# Patient Record
Sex: MALE | Race: WHITE | NOT HISPANIC OR LATINO | Employment: FULL TIME | URBAN - METROPOLITAN AREA
[De-identification: names, ages, dates, MRNs, and addresses within clinical notes are randomized per-mention and may not be internally consistent; named-entity substitution may affect disease eponyms.]

---

## 2017-02-15 ENCOUNTER — GENERIC CONVERSION - ENCOUNTER (OUTPATIENT)
Dept: OTHER | Facility: OTHER | Age: 42
End: 2017-02-15

## 2017-02-15 ENCOUNTER — ALLSCRIPTS OFFICE VISIT (OUTPATIENT)
Dept: OTHER | Facility: OTHER | Age: 42
End: 2017-02-15

## 2017-02-15 LAB
BILIRUB UR QL STRIP: NEGATIVE
CLARITY UR: NORMAL
COLOR UR: YELLOW
GLUCOSE (HISTORICAL): NEGATIVE
HGB UR QL STRIP.AUTO: NEGATIVE
KETONES UR STRIP-MCNC: NEGATIVE MG/DL
LEUKOCYTE ESTERASE UR QL STRIP: NEGATIVE
NITRITE UR QL STRIP: NEGATIVE
PH UR STRIP.AUTO: 7.5 [PH]
PROT UR STRIP-MCNC: NEGATIVE MG/DL
SP GR UR STRIP.AUTO: 1
UROBILINOGEN UR QL STRIP.AUTO: 0.2

## 2017-02-27 ENCOUNTER — GENERIC CONVERSION - ENCOUNTER (OUTPATIENT)
Dept: OTHER | Facility: OTHER | Age: 42
End: 2017-02-27

## 2017-02-27 LAB
25(OH)D3 SERPL-MCNC: 13.6 NG/ML (ref 30–100)
AMBIG ABBREV CMP14 DEFAULT (HISTORICAL): NORMAL
BASOPHILS # BLD AUTO: 0 X10E3/UL (ref 0–0.2)
BASOPHILS # BLD AUTO: 1 %
DEPRECATED RDW RBC AUTO: 12.7 % (ref 12.3–15.4)
EOSINOPHIL # BLD AUTO: 0.2 X10E3/UL (ref 0–0.4)
EOSINOPHIL # BLD AUTO: 5 %
HCT VFR BLD AUTO: 44.3 % (ref 37.5–51)
HGB BLD-MCNC: 15.6 G/DL (ref 12.6–17.7)
IMM.GRANULOCYTES (CD4/8) (HISTORICAL): 0 %
IMM.GRANULOCYTES (CD4/8) (HISTORICAL): 0 X10E3/UL (ref 0–0.1)
LYMPHOCYTES # BLD AUTO: 1.5 X10E3/UL (ref 0.7–3.1)
LYMPHOCYTES # BLD AUTO: 38 %
MCH RBC QN AUTO: 31.9 PG (ref 26.6–33)
MCHC RBC AUTO-ENTMCNC: 35.2 G/DL (ref 31.5–35.7)
MCV RBC AUTO: 91 FL (ref 79–97)
MONOCYTES # BLD AUTO: 0.3 X10E3/UL (ref 0.1–0.9)
MONOCYTES (HISTORICAL): 7 %
NEUTROPHILS # BLD AUTO: 2 X10E3/UL (ref 1.4–7)
NEUTROPHILS # BLD AUTO: 49 %
PLATELET # BLD AUTO: 222 X10E3/UL (ref 150–379)
RBC (HISTORICAL): 4.89 X10E6/UL (ref 4.14–5.8)
WBC # BLD AUTO: 4 X10E3/UL (ref 3.4–10.8)

## 2017-02-28 ENCOUNTER — GENERIC CONVERSION - ENCOUNTER (OUTPATIENT)
Dept: OTHER | Facility: OTHER | Age: 42
End: 2017-02-28

## 2017-02-28 LAB
A/G RATIO (HISTORICAL): 2.1 (ref 1.1–2.5)
ALBUMIN SERPL BCP-MCNC: 4.7 G/DL (ref 3.5–5.5)
ALP SERPL-CCNC: 53 IU/L (ref 39–117)
ALT SERPL W P-5'-P-CCNC: 19 IU/L (ref 0–44)
AST SERPL W P-5'-P-CCNC: 19 IU/L (ref 0–40)
BILIRUB SERPL-MCNC: 0.4 MG/DL (ref 0–1.2)
BUN SERPL-MCNC: 15 MG/DL (ref 6–24)
BUN/CREA RATIO (HISTORICAL): 25 (ref 9–20)
CALCIUM SERPL-MCNC: 10.1 MG/DL (ref 8.7–10.2)
CHLORIDE SERPL-SCNC: 98 MMOL/L (ref 96–106)
CHOLEST SERPL-MCNC: 184 MG/DL (ref 100–199)
CO2 SERPL-SCNC: 27 MMOL/L (ref 18–29)
CREAT SERPL-MCNC: 0.6 MG/DL (ref 0.76–1.27)
EGFR AFRICAN AMERICAN (HISTORICAL): 144 ML/MIN/1.73
EGFR-AMERICAN CALC (HISTORICAL): 125 ML/MIN/1.73
GLUCOSE SERPL-MCNC: 106 MG/DL (ref 65–99)
HDLC SERPL-MCNC: 52 MG/DL
LDLC SERPL CALC-MCNC: 98 MG/DL (ref 0–99)
POTASSIUM SERPL-SCNC: 5 MMOL/L (ref 3.5–5.2)
SODIUM SERPL-SCNC: 141 MMOL/L (ref 134–144)
T4 FREE SERPL-MCNC: 1.18 NG/DL (ref 0.82–1.77)
TOT. GLOBULIN, SERUM (HISTORICAL): 2.2 G/DL (ref 1.5–4.5)
TOTAL PROTEIN (HISTORICAL): 6.9 G/DL (ref 6–8.5)
TRIGL SERPL-MCNC: 168 MG/DL (ref 0–149)
TSH SERPL DL<=0.05 MIU/L-ACNC: 2.1 UIU/ML (ref 0.45–4.5)

## 2017-03-01 ENCOUNTER — GENERIC CONVERSION - ENCOUNTER (OUTPATIENT)
Dept: OTHER | Facility: OTHER | Age: 42
End: 2017-03-01

## 2017-03-01 LAB
INTERPRETATION (HISTORICAL): NORMAL
QAUNTIFERON NIL VALUE (HISTORICAL): 0.05 IU/ML
QFT TB AG MINUS NIL VALUE (HISTORICAL): 0.04 IU/ML
QUANTIFERON CRITERIA (HISTORICAL): NORMAL
QUANTIFERON INCUB COMMENT (HISTORICAL): NORMAL
QUANTIFERON MITOGEN VALUE (HISTORICAL): 8.55 IU/ML
QUANTIFERON TB AG VALUE (HISTORICAL): 0.09 IU/ML
QUANTIFERON TB GOLD (HISTORICAL): NEGATIVE

## 2018-01-13 NOTE — RESULT NOTES
Message   can you please let pt know that his quantiferon gold tb test was negative? marissa you     Verified Results  ([facility]) QuantiFERON TB Gold (In Tube) [id number] 07:56AM Arabella Becerra     Test Name Result Flag Reference   QuantiFERON Incubation Comment     Specimen incubated at [address], [place]  QuantiFERON TB Gold Negative  Negative   QuantiFERON Criteria Comment     To be considered positive a specimen should have a TB Ag minus Nil  value greater than or equal to 0 35 IU/mL and in addition the TB Ag  minus Nil value must be greater than or equal to 25% of the Nil  value  There may be insufficient information in these values to  differentiate between some negative and some indeterminate test  values  QuantiFERON TB Ag Value 0 09 IU/mL     QuantiFERON Nil Value 0 05 IU/mL     QuantiFERON Mitogen Value 8 55 IU/mL     QFT TB Ag minus Nil Value 0 04 IU/mL     Interpretation: Comment     The QuantiFERON TB Gold (in Tube) assay is intended for use as an aid  in the diagnosis of TB infection  Negative results suggest that there  is no TB infection  In patients with high suspicion of exposure, a  negative test should be repeated  A positive test indicates infection  with Mycobacterium tuberculosis  Among individuals without  tuberculosis infection, a positive test may be due to exposure to  [place], M  jv or M  marinum  On the Internet, go to  cdc gov/tb for further details

## 2018-01-14 NOTE — RESULT NOTES
Message   Can you please let patient know that his blood work was overall within a normal range including kidneys, liver, thyroid  His blood sugar was minimally elevated noted to be 106  I would like to check A1c, measure of blood sugar over a 3 month  I will print this out  In addition, his total cholesterol is 184, his bad cholesterol was 98 and his good cholesterol 52  History triglycerides were minimally elevated at 168, recommend avoiding carbs as well as fried fatty foods  In addition, his vitamin D level was noted to be 13 6, in the deficient range  I would like patient to start supplementation  I will call in Rx to his pharmacy  I would like to recheck this in 4 months  I will print out blood work Rx for vitamin D  Thank you     Verified Results  (1) CBC/PLT/DIFF [id number] 07:56AM Arabella Becerra     Test Name Result Flag Reference   WBC 4 0 x10E3/uL  3 4-10 8   RBC 4 89 x10E6/uL  4 14-5 80   Hemoglobin 15 6 g/dL  12 6-17 7   Hematocrit 44 3 %  37 5-51 0   MCV 91 fL  79-97   MCH 31 9 pg  26 6-33 0   MCHC 35 2 g/dL  31 5-35 7   RDW 12 7 %  12 3-15 4   Platelets 643 T01H2/RA  150-379   Neutrophils 49 %     Lymphs 38 %     Monocytes 7 %     Eos 5 %     Basos 1 %     Neutrophils (Absolute) 2 0 x10E3/uL  1 4-7 0   Lymphs (Absolute) 1 5 x10E3/uL  0 7-3 1   Monocytes(Absolute) 0 3 x10E3/uL  0 1-0 9   Eos (Absolute) 0 2 x10E3/uL  0 0-0 4   Baso (Absolute) 0 0 x10E3/uL  0 0-0 2   Immature Granulocytes 0 %     Immature Grans (Abs) 0 0 x10E3/uL  0 0-0 1     [facility] hospital[facility] CMP14 Default [id number] 07:56AM Carolina Christianson     Test Name Result Flag Reference   Guzman Luna CMP14 Default Comment     A hand-written panel/profile was received from your office  In  accordance with the [facility] Ambiguous Test Code Policy dated July 8473, we have completed your order by using the closest currently  or formerly recognized AMA panel    We have assigned Comprehensive  Metabolic Panel (14), Test Code #907872 to this request   If this  is not the testing you wished to receive on this specimen, please  contact the [address] [place] [address] Client Inquiry/Technical Services Department  to clarify the test order  We appreciate your business  [facility]) TSH+Free T4 [id number] 07:56AM Arabella Becerra     Test Name Result Flag Reference   TSH 2 100 uIU/mL  0 450-4 500   T4,Free(Direct) 1 18 ng/dL  0 82-1 77     (1) COMPREHENSIVE METABOLIC PANEL [id number] 26:51UD Arabella Becerra     Test Name Result Flag Reference   Glucose, Serum 106 mg/dL H 65-99   BUN 15 mg/dL  6-24   Creatinine, Serum 0 60 mg/dL L 0 76-1 27   eGFR If NonAfricn Am 125 mL/min/1 73  >59   eGFR If Africn Am 144 mL/min/1 73  >59   BUN/Creatinine Ratio 25 H 9-20   Sodium, Serum 141 mmol/L  134-144   Potassium, Serum 5 0 mmol/L  3 5-5 2   Chloride, Serum 98 mmol/L     Carbon Dioxide, Total 27 mmol/L  18-29   Calcium, Serum 10 1 mg/dL  8 7-10 2   Protein, Total, Serum 6 9 g/dL  6 0-8 5   Albumin, Serum 4 7 g/dL  3 5-5 5   Globulin, Total 2 2 g/dL  1 5-4 5   A/G Ratio 2 1  1 1-2 5   **Effective March 13, 2017 the reference interval**                   for A/G Ratio will be changing to:                               Age                Male          Female                           0 -  7 days       1 1 - 2 3       1 1 - 2 3                           8 - 30 days       1 2 - 2 8       1 2 - 2 8                           1 -  6 months     1 3 - 3 6       1 3 - 3 6                    7 months -  5 years      1 5 - 2 6       1 5 - 2 6                              > 5 years      1 2 - 2 2       1 2 - 2 2   Bilirubin, Total 0 4 mg/dL  0 0-1 2   Alkaline Phosphatase, S 53 IU/L     AST (SGOT) 19 IU/L  0-40   ALT (SGPT) 19 IU/L  0-44     (1) LIPID PANEL FASTING W DIRECT LDL REFLEX [id number] 07:56AM Arabella Becerra     Test Name Result Flag Reference   Cholesterol, Total 184 mg/dL  100-199   Triglycerides 168 mg/dL H 0-149   HDL Cholesterol 52 mg/dL  >39   LDL Cholesterol Calc 98 mg/dL  0-99 (1) VITAMIN D 25-HYDROXY [id number] 07:56AM Arabella Becerra     Test Name Result Flag Reference   Vitamin D, 25-Hydroxy 13 6 ng/mL L 30 0-100 0   Vitamin D deficiency has been defined by the [address] practice guideline as a  level of serum 25-OH vitamin D less than 20 ng/mL (1,2)  The Endocrine Society went on to further define vitamin D  insufficiency as a level between 21 and 29 ng/mL (2)  1  IOM ([place] of Medicine)  2010  Dietary reference     intakes for calcium and D  [facility]: The     [place]  2  Xiomara PORTILLO, Desiree HUTCHINSON, Trevor MARTI, et al      Evaluation, treatment, and prevention of vitamin D     deficiency: an Endocrine Society clinical practice     guideline  JCEM  2011 Jul; 96(7):1911-30

## 2018-01-15 NOTE — RESULT NOTES
Verified Results  Urine Dip Non-Automated- POC [id number] 02:40PM Arabella Becerra     Test Name Result Flag Reference   Color Yellow     Clarity Transparent     Leukocytes negative     Nitrite negative     Blood negative     Bilirubin negative     Urobilinogen 0 2     Protein negative     Ph 7 5     Specific Gravity 1 000     Ketone negative     Glucose negative     Color Yellow     Clarity Transparent     Leukocytes negative     Nitrite negative     Blood negative     Bilirubin negative     Urobilinogen 0 2     Protein negative     Ph 7 5     Specific Gravity 1 000     Ketone negative     Glucose negative               Plan  Need for Tdap vaccination    · Adacel 5-2-15 5 LF-MCG/0 5 Intramuscular Suspension

## 2018-01-15 NOTE — PROGRESS NOTES
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)   · treated for 11 months    Plan   Fatigue    · (1) CBC/PLT/DIFF; Status:Active; Requested for:66Znt7312;    · (1) VITAMIN D 25-HYDROXY; Status:Active; Requested for:09Nzx0688;   Fatigue, Hypertension    · (1) COMPREHENSIVE METABOLIC PANEL; Status:Active; Requested for:39Rja1341;    · (1) TSH WITH FT4 REFLEX; Status:Active; Requested for:65Mzq3159; Health Maintenance    · Begin a limited exercise program ; Status:Complete;   Done: 63AED1557   · Begin or continue regular aerobic exercise  Gradually work up to at least 3 sessions of 30  minutes of exercise a week ; Status:Complete;   Done: 25YVH0452   · Brush your teeth 3 times a day and floss at least once a day ; Status:Complete;   Done:  27RPO6582   · Decreasing the stress in your life may help your condition improve ; Status:Complete;    Done: 72GWC5411   · Drink plenty of fluids ; Status:Complete;   Done: 77NTJ5380   · Regular aerobic exercise can help reduce stress ; Status:Complete;   Done: 93ULE7833   · Stretch and warm up your muscles during the first 10 minutes , then cool down your  muscles for the last 10 minutes of exercise ; Status:Complete;   Done: 39QAL8394   · Use a sun block product with an SPF of 15 or more ; Status:Complete;   Done:  62DTF4199   · We recommend routine visits to a dentist ; Status:Complete;   Done: 59YRV7844   · We recommend that you follow the "Mediterranean diet "; Status:Complete;   Done:  49YIZ6842   · Call [phone] if: You have any warning signs of skin cancer ; Status:Complete;    Done: 83QCV7533   · Call 911 if:  You experience a new kind of chest pain (angina) or pressure ;  Status:Complete;   Done: 27OPG2289  History of positive PPD    · (1) QUANTIFERON - TB GOLD; Status:Active; Requested for:82Uzi3454;   Screening for lipoid disorders    · (1) LIPID PANEL FASTING W DIRECT LDL REFLEX; Status:Active; Requested  for:24Gbi8188;     Urine Dip Non-Automated- POC; Status:Resulted - Requires Verification,Retrospective By Protocol Authorization;   Done: 71UPB6973 12:00AM  Due:22Gqq5028; Last Updated By:Uyen Crowe; 2/15/2017 4:27:06 PM;Ordered;    For:Health Maintenance; Ordered By:Arabella Becerra; Discussion/Summary  Impression: health maintenance visit  Currently, he eats a healthy diet  Prostate cancer screening: PSA is not indicated  Colorectal cancer screening: colorectal cancer screening is not indicated  Screening lab work includes glucose, lipid profile and 25-hydroxyvitamin D  He was advised to be evaluated by an ophthalmologist and a dentist  Advice and education were given regarding nutrition, aerobic exercise, vitamin D supplements and sunscreen use  51-year-old male here for routine health maintenance exam      Re  HTN: Initially elevated however upon recheck decreased  Patient's Benicar was recently switched to generic brand  He will let me know if it continues to be elevated  Will write Rx for layla  Will monitor  Re  h/o PPD positive history, will check QuantiFERON gold    Re  [facility] Will check routine blood work  Administered tdap today  Discussed routine health maintenance including maintaining adequate amounts of fruits and vegetables, exercising routinely, getting adequate amount of sleep  Care guide has been provided  Will call patient with results  Possible side effects of new medications were reviewed with the patient/guardian today  The treatment plan was reviewed with the patient/guardian   The patient/guardian understands and agrees with the treatment plan   The patient was counseled regarding diagnostic results, instructions for management, risk factor reductions, prognosis, patient and family education, impressions, risks and benefits of treatment options, importance of compliance with treatment  Chief Complaint  Patient is here for a yearly physical Patient need a screening for tuberculosis  All medications were reviewed and updated with the patient  History of Present Illness  HM, Adult Male: The patient is being seen for a health maintenance evaluation  General Health: The patient's health since the last visit is described as good  He has regular dental visits  Lifestyle:  He consumes a diverse and healthy diet  He does not use tobacco    Screening:   HPI: 80-year-old male here for routine health maintenance exam  Patient has a history of hypertension  He has been on Benicar however more recently over the past few days was switched to a generic brand of Benicar  Denies any complaints  Patient has history of PPD positive history of was treated with medications for 11 months in the past  He does require TB testing is agreeable to have quantiferon gold  Review of Systems    Constitutional: no fever and no chills  Eyes: No complaints of eye pain, no red eyes, no discharge from eyes, no itchy eyes  ENT: no complaints of earache, no hearing loss, no nosebleeds, no nasal discharge, no sore throat, no hoarseness  Cardiovascular: no chest pain, no palpitations and no extremity edema  Respiratory: no shortness of breath  Gastrointestinal: no abdominal pain and no constipation  Genitourinary: no dysuria  Psychiatric: no anxiety and no depression  Active Problems    1  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)   2  Hypertension (401 9) (I10)   3  Neck pain (723 1) (M54 2)   4   Obesity (278 00) (E66 9)    Past Medical History    · History of Gout of big toe (274 01) (M10 9)   · History of Herniated disc (722 2)    Surgical History    · History of Prior Surgical Procedure Not Done    Family History  Father    · Family history of gout (V18 19) (Z82 69)    Social History    · Exercises regularly   · Never smoker   · Social alcohol use (Z78 9)    Current Meds   1  Benicar 20 MG Oral Tablet; TAKE 1 TABLET DAILY; Therapy: 57XQM3482 to (Henrietta Mustache)  Requested for: 21XPF8571; Last   Rx:46Gff5663 Ordered   2  ZyrTEC Allergy 10 MG Oral Tablet; Therapy: 31TAP6243 to Recorded    Allergies    1  No Known Drug Allergies    Vitals   Recorded: 52AAR7946 03:08PM Recorded: 95BFD0048 02:29PM   Temperature  97 F   Heart Rate  68   Respiration  18   Systolic 941 754   Diastolic 88 94   Height  6 ft 0 5 in   Weight  221 lb 4 00 oz   BMI Calculated  29 59   BSA Calculated  2 24     Physical Exam    Constitutional   General appearance: No acute distress, well appearing and well nourished  Eyes   Conjunctiva and lids: No erythema, swelling or discharge  Pupils and irises: Equal, round, reactive to light  Ears, Nose, Mouth, and Throat   Otoscopic examination: Tympanic membranes translucent with normal light reflex  Canals patent without erythema  Oropharynx: Normal with no erythema, edema, exudate or lesions  Neck   Neck: Supple, symmetric, trachea midline, no masses  Thyroid: Normal, no thyromegaly  Pulmonary   Respiratory effort: No increased work of breathing or signs of respiratory distress  Auscultation of lungs: Clear to auscultation  Cardiovascular   Auscultation of heart: Normal rate and rhythm, normal S1 and S2, no murmurs  Carotid pulses: 2+ bilaterally  Examination of extremities for edema and/or varicosities: Normal     Abdomen   Abdomen: Non-tender, no masses  Liver and spleen: No hepatomegaly or splenomegaly  Lymphatic   Palpation of lymph nodes in neck: No lymphadenopathy  Neurologic   Cranial nerves: Cranial nerves 2-12 intact      Psychiatric   Mood and affect: Normal        Results/Data  PHQ-2 Adult Depression Screening 20FTL4235 02:41PM User, s     Test Name Result Flag Reference   PHQ-2 Adult Depression Score 0     Over the last two weeks, how often have you been bothered by any of the following problems?   Little interest or pleasure in doing things: Not at all - 0  Feeling down, depressed, or hopeless: Not at all - 0   PHQ-2 Adult Depression Screening Negative         Signatures   Electronically signed by : Carol Pratt MD; Feb 17 2017  7:40PM EST                       (Author)

## 2018-01-22 VITALS
BODY MASS INDEX: 29.32 KG/M2 | HEART RATE: 68 BPM | SYSTOLIC BLOOD PRESSURE: 120 MMHG | RESPIRATION RATE: 18 BRPM | TEMPERATURE: 97 F | WEIGHT: 221.25 LBS | DIASTOLIC BLOOD PRESSURE: 88 MMHG | HEIGHT: 73 IN

## 2018-02-03 DIAGNOSIS — I10 HYPERTENSION, UNSPECIFIED TYPE: Primary | ICD-10-CM

## 2018-02-03 RX ORDER — OLMESARTAN MEDOXOMIL 20 MG/1
TABLET, FILM COATED ORAL
Qty: 90 TABLET | Refills: 0 | Status: SHIPPED | OUTPATIENT
Start: 2018-02-03 | End: 2018-02-16 | Stop reason: CLARIF

## 2018-02-15 ENCOUNTER — TELEPHONE (OUTPATIENT)
Dept: FAMILY MEDICINE CLINIC | Facility: CLINIC | Age: 43
End: 2018-02-15

## 2018-02-15 NOTE — TELEPHONE ENCOUNTER
Pt's insurance denied Coverage for Brand Benicar  Pt must have tried and failed 3 of the formulary alternatives prior to covering Brand necessary med  Formulary alternatives are as follows:  1  Candesartan  2  Eprosartan  3  Irbesartan  4  Losartan  5  Olmesartasn  6  Telmisartan  7  Valsartan  Please change to formulary alternative  Please call pt with outcome

## 2018-02-16 DIAGNOSIS — I10 HYPERTENSION, UNSPECIFIED TYPE: Primary | ICD-10-CM

## 2018-02-16 RX ORDER — LOSARTAN POTASSIUM 50 MG/1
25 TABLET ORAL DAILY
Qty: 30 TABLET | Refills: 5 | Status: SHIPPED | OUTPATIENT
Start: 2018-02-16 | End: 2018-06-04 | Stop reason: SDUPTHER

## 2018-02-16 NOTE — TELEPHONE ENCOUNTER
I will call in an Rx for losartan 50 milligrams which is approximately equivalent to 20 milligrams of olmesartan which patient is taking  Patient is due for an office visit at the end of the month as well as blood work and blood pressure check  Because I am changing blood pressure medications, I would like patient to come in for Re follow-up appointment with me in 2-3 weeks    Thank you

## 2018-02-28 ENCOUNTER — TELEPHONE (OUTPATIENT)
Dept: FAMILY MEDICINE CLINIC | Facility: CLINIC | Age: 43
End: 2018-02-28

## 2018-02-28 NOTE — TELEPHONE ENCOUNTER
Patient needs a TB test done for his employment, he stated he can't do the skin test that last year you order bloodwork to check for this  Please write a RX for patient to have this done again  Also he wants to know if he needs any annual bloodwork done for his upcoming appointment w/ you in March so he can do everything at once  Please advise patient at [phone]

## 2018-03-01 ENCOUNTER — TELEPHONE (OUTPATIENT)
Dept: FAMILY MEDICINE CLINIC | Facility: CLINIC | Age: 43
End: 2018-03-01

## 2018-03-01 DIAGNOSIS — Z92.89 HISTORY OF POSITIVE PPD: Primary | ICD-10-CM

## 2018-03-01 DIAGNOSIS — I10 HYPERTENSION, UNSPECIFIED TYPE: ICD-10-CM

## 2018-03-01 DIAGNOSIS — Z13.220 ENCOUNTER FOR LIPID SCREENING FOR CARDIOVASCULAR DISEASE: ICD-10-CM

## 2018-03-01 DIAGNOSIS — Z13.6 ENCOUNTER FOR LIPID SCREENING FOR CARDIOVASCULAR DISEASE: ICD-10-CM

## 2018-03-01 DIAGNOSIS — E55.9 VITAMIN D DEFICIENCY: ICD-10-CM

## 2018-03-01 NOTE — TELEPHONE ENCOUNTER
Following up on previous message from yesterday  Please fax the script over if possible  Please call me before sending it

## 2018-03-17 LAB
25(OH)D3 SERPL-MCNC: 24 NG/ML (ref 30–100)
ALBUMIN SERPL-MCNC: 4.8 G/DL (ref 3.6–5.1)
ALBUMIN/GLOB SERPL: 2.2 (CALC) (ref 1–2.5)
ALP SERPL-CCNC: 53 U/L (ref 40–115)
ALT SERPL-CCNC: 24 U/L (ref 9–46)
AST SERPL-CCNC: 20 U/L (ref 10–40)
BASOPHILS # BLD AUTO: 40 CELLS/UL (ref 0–200)
BASOPHILS NFR BLD AUTO: 1.1 %
BILIRUB SERPL-MCNC: 1 MG/DL (ref 0.2–1.2)
BUN SERPL-MCNC: 16 MG/DL (ref 7–25)
BUN/CREAT SERPL: NORMAL (CALC) (ref 6–22)
CALCIUM SERPL-MCNC: 9.8 MG/DL (ref 8.6–10.3)
CHLORIDE SERPL-SCNC: 101 MMOL/L (ref 98–110)
CHOLEST SERPL-MCNC: 196 MG/DL
CHOLEST/HDLC SERPL: 4.1 (CALC)
CO2 SERPL-SCNC: 28 MMOL/L (ref 20–31)
CREAT SERPL-MCNC: 1.03 MG/DL (ref 0.6–1.35)
EOSINOPHIL # BLD AUTO: 148 CELLS/UL (ref 15–500)
EOSINOPHIL NFR BLD AUTO: 4.1 %
ERYTHROCYTE [DISTWIDTH] IN BLOOD BY AUTOMATED COUNT: 12.5 % (ref 11–15)
GLOBULIN SER CALC-MCNC: 2.2 G/DL (CALC) (ref 1.9–3.7)
GLUCOSE SERPL-MCNC: 91 MG/DL (ref 65–99)
HCT VFR BLD AUTO: 46 % (ref 38.5–50)
HDLC SERPL-MCNC: 48 MG/DL
HGB BLD-MCNC: 15.7 G/DL (ref 13.2–17.1)
LDLC SERPL CALC-MCNC: 122 MG/DL (CALC)
LYMPHOCYTES # BLD AUTO: 1508 CELLS/UL (ref 850–3900)
LYMPHOCYTES NFR BLD AUTO: 41.9 %
MCH RBC QN AUTO: 31.7 PG (ref 27–33)
MCHC RBC AUTO-ENTMCNC: 34.1 G/DL (ref 32–36)
MCV RBC AUTO: 92.7 FL (ref 80–100)
MONOCYTES # BLD AUTO: 259 CELLS/UL (ref 200–950)
MONOCYTES NFR BLD AUTO: 7.2 %
NEUTROPHILS # BLD AUTO: 1645 CELLS/UL (ref 1500–7800)
NEUTROPHILS NFR BLD AUTO: 45.7 %
NONHDLC SERPL-MCNC: 148 MG/DL (CALC)
PLATELET # BLD AUTO: 202 THOUSAND/UL (ref 140–400)
PMV BLD REES-ECKER: 10.6 FL (ref 7.5–12.5)
POTASSIUM SERPL-SCNC: 4.4 MMOL/L (ref 3.5–5.3)
PROT SERPL-MCNC: 7 G/DL (ref 6.1–8.1)
RBC # BLD AUTO: 4.96 MILLION/UL (ref 4.2–5.8)
SL AMB EGFR AFRICAN AMERICAN: 103 ML/MIN/1.73M2
SL AMB EGFR NON AFRICAN AMERICAN: 89 ML/MIN/1.73M2
SODIUM SERPL-SCNC: 139 MMOL/L (ref 135–146)
TRIGL SERPL-MCNC: 148 MG/DL
TSH SERPL-ACNC: 1.42 MIU/L (ref 0.4–4.5)
WBC # BLD AUTO: 3.6 THOUSAND/UL (ref 3.8–10.8)

## 2018-03-18 DIAGNOSIS — D72.819 LEUKOPENIA, UNSPECIFIED TYPE: Primary | ICD-10-CM

## 2018-03-18 NOTE — PROGRESS NOTES
Can you please let patient know that his blood work is overall stable but will discuss more in detail with him when he comes in for his appointment  In addition, his WBC is minimally decreased and I would like to repeat this in the next 2-4 weeks  I will print this out  In addition, his vitamin-D was lower and I would like him to start 2000 international units a vitamin-D supplementation daily   Thank you

## 2018-03-19 ENCOUNTER — OFFICE VISIT (OUTPATIENT)
Dept: FAMILY MEDICINE CLINIC | Facility: CLINIC | Age: 43
End: 2018-03-19
Payer: COMMERCIAL

## 2018-03-19 ENCOUNTER — TELEPHONE (OUTPATIENT)
Dept: FAMILY MEDICINE CLINIC | Facility: CLINIC | Age: 43
End: 2018-03-19

## 2018-03-19 VITALS
HEART RATE: 68 BPM | BODY MASS INDEX: 31.5 KG/M2 | WEIGHT: 225 LBS | TEMPERATURE: 97.1 F | SYSTOLIC BLOOD PRESSURE: 118 MMHG | RESPIRATION RATE: 16 BRPM | HEIGHT: 71 IN | DIASTOLIC BLOOD PRESSURE: 88 MMHG

## 2018-03-19 DIAGNOSIS — R53.83 FATIGUE, UNSPECIFIED TYPE: ICD-10-CM

## 2018-03-19 DIAGNOSIS — F41.1 GENERALIZED ANXIETY DISORDER: ICD-10-CM

## 2018-03-19 DIAGNOSIS — I10 HYPERTENSION, UNSPECIFIED TYPE: ICD-10-CM

## 2018-03-19 DIAGNOSIS — R13.10 FOOD STICKS ON SWALLOWING: ICD-10-CM

## 2018-03-19 DIAGNOSIS — R06.02 SOBOE (SHORTNESS OF BREATH ON EXERTION): ICD-10-CM

## 2018-03-19 DIAGNOSIS — Z00.00 ROUTINE HEALTH MAINTENANCE: Primary | ICD-10-CM

## 2018-03-19 PROCEDURE — 93000 ELECTROCARDIOGRAM COMPLETE: CPT | Performed by: FAMILY MEDICINE

## 2018-03-19 PROCEDURE — 99396 PREV VISIT EST AGE 40-64: CPT | Performed by: FAMILY MEDICINE

## 2018-03-19 NOTE — TELEPHONE ENCOUNTER
----- Message from Bird Ledbetter MD sent at 3/18/2018  1:53 PM EDT -----  Can you please let patient know that his blood work is overall stable but will discuss more in detail with him when he comes in for his appointment  In addition, his WBC is minimally decreased and I would like to repeat this in the next 2-4 weeks  I will print this out  In addition, his vitamin-D was lower and I would like him to start 2000 international units a vitamin-D supplementation daily   Thank you

## 2018-03-19 NOTE — TELEPHONE ENCOUNTER
----- Message from Fernanda Gonzales MD sent at 3/18/2018  1:53 PM EDT -----  Can you please let patient know that his blood work is overall stable but will discuss more in detail with him when he comes in for his appointment  In addition, his WBC is minimally decreased and I would like to repeat this in the next 2-4 weeks  I will print this out  In addition, his vitamin-D was lower and I would like him to start 2000 international units a vitamin-D supplementation daily   Thank you

## 2018-03-19 NOTE — PROGRESS NOTES
FAMILY PRACTICE OFFICE VISIT       NAME: Cheyenne Swenson  AGE: 43 y o  SEX: male       : 1975        MRN: [id number]    DATE: 3/20/2018  TIME: 10:41 PM    Assessment and Plan     Problem List Items Addressed This Visit     Routine health maintenance - Primary       Recommend continuing with well-balanced diet, getting adequate amount of sleep, continue with routine exercise regimen  Care guided provided  Hypertension       BP is within normal range however diastolic BP is minimally increased  Continue with losartan 50 mg daily  I would like patient to monitor this at home and return in 2-3 weeks for BP check  Continue to limit sodium in the diet, exercise routinely  Relevant Orders    Ambulatory referral to Cardiology    Generalized anxiety disorder    Food sticks on swallowing       I am uncertain of patient's etiology  I would like patient to be further evaluated by Gastroenterology  Referral has been provided  Relevant Orders    Ambulatory referral to Gastroenterology    Fatigue    Relevant Orders    Testosterone, free, total    POCT ECG (Completed)    SOBOE (shortness of breath on exertion)       I am unclear of patient's etiology of shortness of breath on exertion  Patient does to cardio over if he sprints he gets out of breath  POC EKG with ? Sinus here with renetta  I would like patient to be further evaluated by Cardiology  Will also order echocardiogram, PFT, chest x-ray  Return parameters discussed  Relevant Orders    Complete pulmonary function test    XR chest pa & lateral    Echo stress test w contrast if indicated    Ambulatory referral to Cardiology    POCT ECG (Completed)            There are no Patient Instructions on file for this visit  Chief Complaint     Chief Complaint   Patient presents with    Follow-up     Patient is here for a followup for elevated blood pressure          History of Present Illness     HPI   Patient is here for routine health maintenance exam and follow-up of Bp  Patient states he is doing well overall  He states he got a Fitbit to 3 weeks ago  He feels he does not lose weight is fast even though he does exercise almost daily  He would like to lose another 20-25 lb  Has been doing cardio almost every day including walking, running  Patient states he does get out of breath when he sprints and does high intensity like going up the stairs vastly  He is concerned about this  He is also fatigued  In addition, patient states he occasionally experiences a feeling of food getting stuck in his esophagus  He usually has to drink warm water to bring the food down  Patient states her time spent is frightening for him and he becomes  Anxious regarding this  Review of Systems   Review of Systems   Constitutional: Negative for unexpected weight change  Eyes: Negative for visual disturbance  Respiratory: Negative for shortness of breath  Cardiovascular: Negative  Negative for chest pain, palpitations and leg swelling  Gastrointestinal: Negative for abdominal pain  Genitourinary: Negative for dysuria  Psychiatric/Behavioral: Negative for dysphoric mood  Active Problem List     Patient Active Problem List   Diagnosis    Routine health maintenance    Hypertension    Generalized anxiety disorder    Food sticks on swallowing    Fatigue    SOBOE (shortness of breath on exertion)       Past Medical History:  No past medical history on file  Past Surgical History:  No past surgical history on file  Family History:  Family History   Problem Relation Age of Onset    Hypertension Mother     Gout Father        Social History:  Social History     Social History    Marital status: /Civil Union     Spouse name: N/A    Number of children: N/A    Years of education: N/A     Occupational History    Not on file       Social History Main Topics    Smoking status: Never Smoker    Smokeless tobacco: Never Used    Alcohol use Yes      Comment: Social    Drug use: No    Sexual activity: Not on file     Other Topics Concern    Not on file     Social History Narrative    Exercises regularly     I have reviewed the patient's medical history in detail; there are no changes to the history as noted in the electronic medical record  Objective     Vitals:    03/19/18 1445   BP: 118/88   Pulse: 68   Resp: 16   Temp: (!) 97 1 °F (36 2 °C)     Wt Readings from Last 3 Encounters:   03/19/18 102 kg (225 lb)   02/15/17 100 kg (221 lb 4 oz)   11/02/15 106 kg (233 lb 2 oz)       Physical Exam   Constitutional: He is oriented to person, place, and time  He appears well-developed and well-nourished  HENT:   Head: Normocephalic and atraumatic  Mouth/Throat: Oropharynx is clear and moist      Tms intact and clear   Eyes: Conjunctivae and EOM are normal  Pupils are equal, round, and reactive to light  Neck: Normal range of motion  Neck supple  No thyromegaly present  Cardiovascular: Normal rate and regular rhythm  No murmur heard  Pulmonary/Chest: Effort normal and breath sounds normal    Abdominal: Soft  Bowel sounds are normal  There is no tenderness  Musculoskeletal: Normal range of motion  He exhibits no edema  Lymphadenopathy:     He has no cervical adenopathy  Neurological: He is alert and oriented to person, place, and time  Skin: No rash noted  Psychiatric: He has a normal mood and affect  Nursing note and vitals reviewed        Pertinent Laboratory/Diagnostic Studies:  Lab Results   Component Value Date    GLUCOSE 106 (H) 02/27/2017    BUN 16 03/16/2018    CREATININE 1 03 03/16/2018    CALCIUM 9 8 03/16/2018     02/27/2017    K 5 0 02/27/2017    CO2 27 02/27/2017    CL 98 02/27/2017     Lab Results   Component Value Date    ALT 19 02/27/2017    AST 19 02/27/2017    ALKPHOS 53 02/27/2017    BILITOT 0 4 02/27/2017       Lab Results   Component Value Date    WBC 4 0 02/27/2017    HGB 15 7 03/16/2018    HCT 46 0 03/16/2018    MCV 92 7 03/16/2018     03/16/2018       No results found for: TSH    Lab Results   Component Value Date    CHOL 184 02/27/2017     Lab Results   Component Value Date    TRIG 148 03/16/2018     Lab Results   Component Value Date    HDL 52 02/27/2017     Lab Results   Component Value Date    LDLCALC 98 02/27/2017     No results found for: HGBA1C    Results for orders placed or performed in visit on 03/16/18   Lipid Panel with Direct LDL reflex   Result Value Ref Range    Total Cholesterol 196 <200 mg/dL    SL AMB HDL CHOLESTEROL 48 >40 mg/dL    Triglycerides 148 <150 mg/dL    SL AMB LDL-CHOLESTEROL 122 (H) mg/dL (calc)    SL AMB CHOL/HDLC RATIO 4 1 <5 0 (calc)    SL AMB NON HDL CHOLESTEROL 148 (H) <130 mg/dL (calc)   Comprehensive metabolic panel   Result Value Ref Range    SL AMB GLUCOSE 91 65 - 99 mg/dL    BUN 16 7 - 25 mg/dL    Creatinine, Serum 1 03 0 60 - 1 35 mg/dL    eGFR Non  89 > OR = 60 mL/min/1 73m2    SL AMB EGFR  103 > OR = 60 mL/min/1 73m2    SL AMB BUN/CREATININE RATIO NOT APPLICABLE 6 - 22 (calc)    SL AMB SODIUM 139 135 - 146 mmol/L    SL AMB POTASSIUM 4 4 3 5 - 5 3 mmol/L    SL AMB CHLORIDE 101 98 - 110 mmol/L    SL AMB CARBON DIOXIDE 28 20 - 31 mmol/L    SL AMB CALCIUM 9 8 8 6 - 10 3 mg/dL    SL AMB PROTEIN, TOTAL 7 0 6 1 - 8 1 g/dL    Serum Albumin 4 8 3 6 - 5 1 g/dL    SL AMB GLOBULIN 2 2 1 9 - 3 7 g/dL (calc)    SL AMB ALBUMIN/GLOBULIN RATIO 2 2 1 0 - 2 5 (calc)    SL AMB BILIRUBIN, TOTAL 1 0 0 2 - 1 2 mg/dL    SL AMB ALKALINE PHOSPHATASE 53 40 - 115 U/L    SL AMB AST 20 10 - 40 U/L    SL AMB ALT 24 9 - 46 U/L   CBC and differential   Result Value Ref Range    SL AMB LAB WHITE BLOOD CELL COUNT 3 6 (L) 3 8 - 10 8 Thousand/uL    SL AMB LAB RED BLOOD CELLS 4 96 4 20 - 5 80 Million/uL    Hemoglobin 15 7 13 2 - 17 1 g/dL    Hematocrit 46 0 38 5 - 50 0 %    MCV 92 7 80 0 - 100 0 fL    MCH 31 7 27 0 - 33 0 pg    MCHC 34 1 32 0 - 36 0 g/dL    RDW 12 5 11 0 - 15 0 %    Platelet Count 857 984 - 400 Thousand/uL    SL AMB MPV 10 6 7 5 - 12 5 fL    Neutrophils (Absolute) 1,645 1,500 - 7,800 cells/uL    Lymphocytes (Absolute) 1,508 850 - 3,900 cells/uL    Monocytes (Absolute) 259 200 - 950 cells/uL    Eosinophils (Absolute) 148 15 - 500 cells/uL    Basophils (Absolute) 40 0 - 200 cells/uL    Neutrophils 45 7 %    Lymphocytes 41 9 %    Monocytes 7 2 %    Eosinophils 4 1 %    Basophils 1 1 %   Vitamin D 25 hydroxy   Result Value Ref Range    Vitamin D, 25-Hydroxy, Serum 24 (L) 30 - 100 ng/mL   TSH, 3rd generation with T4 reflex   Result Value Ref Range    SL AMB TSH W/ REFLEX TO FREE T4 1 42 0 40 - 4 50 mIU/L       Orders Placed This Encounter   Procedures    XR chest pa & lateral    Testosterone, free, total    Ambulatory referral to Gastroenterology    Ambulatory referral to Cardiology    Echo stress test w contrast if indicated    POCT ECG    Complete pulmonary function test       ALLERGIES:  No Known Allergies    Current Medications     Current Outpatient Prescriptions   Medication Sig Dispense Refill    losartan (COZAAR) 50 mg tablet Take 0 5 tablets (25 mg total) by mouth daily 30 tablet 5     No current facility-administered medications for this visit            Health Maintenance     Health Maintenance   Topic Date Due    HIV SCREENING  1975    INFLUENZA VACCINE  09/01/2017    Depression Screening PHQ-9  03/19/2019    DTaP,Tdap,and Td Vaccines (8 - Td) 02/15/2027     Immunization History   Administered Date(s) Administered    DT (pediatric) 07/19/1990    DTP 1975, 02/13/1976, 05/18/1976, 12/21/1976, 06/03/1981    Hep B, Adolescent or Pediatric 04/01/1993, 05/08/1993, 05/06/1994    MMR 09/29/1976, 06/22/1982    OPV 01/13/1976, 03/17/1976, 06/22/1976, 04/04/1977    Tdap 06/16/2006, 02/15/2017       Declan Torres MD

## 2018-03-20 PROBLEM — R53.83 FATIGUE: Status: ACTIVE | Noted: 2018-03-20

## 2018-03-20 PROBLEM — R13.10 FOOD STICKS ON SWALLOWING: Status: ACTIVE | Noted: 2018-03-20

## 2018-03-20 PROBLEM — I10 HYPERTENSION: Status: ACTIVE | Noted: 2018-03-20

## 2018-03-20 PROBLEM — F41.1 GENERALIZED ANXIETY DISORDER: Status: ACTIVE | Noted: 2018-03-20

## 2018-03-20 PROBLEM — Z00.00 ROUTINE HEALTH MAINTENANCE: Status: ACTIVE | Noted: 2018-03-20

## 2018-03-20 PROBLEM — R06.02 SOBOE (SHORTNESS OF BREATH ON EXERTION): Status: ACTIVE | Noted: 2018-03-20

## 2018-03-21 NOTE — ASSESSMENT & PLAN NOTE
Recommend continuing with well-balanced diet, getting adequate amount of sleep, continue with routine exercise regimen  Care guided provided

## 2018-03-21 NOTE — ASSESSMENT & PLAN NOTE
BP is within normal range however diastolic BP is minimally increased  Continue with losartan 50 mg daily  I would like patient to monitor this at home and return in 2-3 weeks for BP check  Continue to limit sodium in the diet, exercise routinely

## 2018-03-21 NOTE — ASSESSMENT & PLAN NOTE
I am unclear of patient's etiology of shortness of breath on exertion  Patient does to cardio over if he sprints he gets out of breath  POC EKG with ? Sinus here with renetta  I would like patient to be further evaluated by Cardiology  Will also order echocardiogram, PFT, chest x-ray  Return parameters discussed

## 2018-04-09 ENCOUNTER — OFFICE VISIT (OUTPATIENT)
Dept: CARDIOLOGY CLINIC | Facility: CLINIC | Age: 43
End: 2018-04-09
Payer: COMMERCIAL

## 2018-04-09 VITALS
HEART RATE: 72 BPM | WEIGHT: 222 LBS | HEIGHT: 73 IN | SYSTOLIC BLOOD PRESSURE: 136 MMHG | BODY MASS INDEX: 29.42 KG/M2 | DIASTOLIC BLOOD PRESSURE: 88 MMHG

## 2018-04-09 DIAGNOSIS — I10 HYPERTENSION, UNSPECIFIED TYPE: ICD-10-CM

## 2018-04-09 DIAGNOSIS — R06.02 SOBOE (SHORTNESS OF BREATH ON EXERTION): ICD-10-CM

## 2018-04-09 PROCEDURE — 99203 OFFICE O/P NEW LOW 30 MIN: CPT | Performed by: INTERNAL MEDICINE

## 2018-04-09 RX ORDER — MELATONIN
1000 DAILY
COMMUNITY
End: 2018-12-19

## 2018-04-09 NOTE — PROGRESS NOTES
Cardiology Follow Up    Boogie Ford  1975  [id number]  HEART & VASCULAR Hailee Matias  [facility]  [address] [place] [address] [address]    1  Hypertension, unspecified type  Ambulatory referral to Cardiology   2  SOBOE (shortness of breath on exertion)  Ambulatory referral to Cardiology    Stress test only, exercise    Echo complete with contrast if indicated       Interval History:  Cardiology consultation  For 51-year-old male who has no previous cardiac history  History upper tension, mostly well control on ARB therapy  Kindly referred by primary physician for evaluation of exertional dyspnea  The patient tells me that he exercises regularly almost 5 times a week  He does not really have any exertional symptoms when he warms up but he states but when he all of a sudden is to rush something he feels very short of breath  This is a chronic problem  He does not notice any wheezing  And there is no associated chest discomfort  There is no crescendo pattern  But is very concerned about the symptoms  Patient Active Problem List   Diagnosis    Routine health maintenance    Hypertension    Generalized anxiety disorder    Food sticks on swallowing    Fatigue    SOBOE (shortness of breath on exertion)     Past Medical History:   Diagnosis Date    Fatigue     Generalized anxiety disorder     Hypertension     SOBOE (shortness of breath on exertion)      Social History     Social History    Marital status: /Civil Union     Spouse name: N/A    Number of children: N/A    Years of education: N/A     Occupational History    Not on file       Social History Main Topics    Smoking status: Never Smoker    Smokeless tobacco: Never Used    Alcohol use Yes      Comment: Social    Drug use: No    Sexual activity: Not on file     Other Topics Concern    Not on file     Social History Narrative    Exercises regularly      Family History   Problem Relation Age of Onset    Hypertension Mother     Gout Father      No past surgical history on file      Current Outpatient Prescriptions:     cholecalciferol (VITAMIN D3) 1,000 units tablet, Take 1,000 Units by mouth daily, Disp: , Rfl:     losartan (COZAAR) 50 mg tablet, Take 0 5 tablets (25 mg total) by mouth daily, Disp: 30 tablet, Rfl: 5  No Known Allergies    Labs:  Orders Only on 03/16/2018   Component Date Value    Total Cholesterol 03/16/2018 196     SL AMB HDL CHOLESTEROL 03/16/2018 48     Triglycerides 03/16/2018 148     SL AMB LDL-CHOLESTEROL 03/16/2018 122*    SL AMB CHOL/HDLC RATIO 03/16/2018 4 1     SL AMB NON HDL CHOLESTER* 03/16/2018 148*    SL AMB GLUCOSE 03/16/2018 91     BUN 03/16/2018 16     Creatinine, Serum 03/16/2018 1 03     eGFR Non  03/16/2018 89     SL AMB EGFR  AMER* 03/16/2018 103     SL AMB BUN/CREATININE RA* 88/69/4230 NOT APPLICABLE     SL AMB SODIUM 03/16/2018 139     SL AMB POTASSIUM 03/16/2018 4 4     SL AMB CHLORIDE 03/16/2018 101     SL AMB CARBON DIOXIDE 03/16/2018 28     SL AMB CALCIUM 03/16/2018 9 8     SL AMB PROTEIN, TOTAL 03/16/2018 7 0     Serum Albumin 03/16/2018 4 8     SL AMB GLOBULIN 03/16/2018 2 2     SL AMB ALBUMIN/GLOBULIN * 03/16/2018 2 2     SL AMB BILIRUBIN, TOTAL 03/16/2018 1 0     SL AMB ALKALINE PHOSPHAT* 03/16/2018 53     SL AMB AST 03/16/2018 20     SL AMB ALT 03/16/2018 24     SL AMB LAB WHITE BLOOD C* 03/16/2018 3 6*    SL AMB LAB RED BLOOD JONATAN* 03/16/2018 4 96     Hemoglobin 03/16/2018 15 7     Hematocrit 03/16/2018 46 0     MCV 03/16/2018 92 7     MCH 03/16/2018 31 7     MCHC 03/16/2018 34 1     RDW 03/16/2018 12 5     Platelet Count 92/69/3160 202     SL AMB MPV 03/16/2018 10 6     Neutrophils (Absolute) 03/16/2018 1645     Lymphocytes (Absolute) 03/16/2018 1508     Monocytes (Absolute) 03/16/2018 259     Eosinophils (Absolute) 03/16/2018 148     Basophils (Absolute) 03/16/2018 40     Neutrophils 03/16/2018 45 7     Lymphocytes 03/16/2018 41 9     Monocytes 03/16/2018 7 2     Eosinophils 03/16/2018 4 1     Basophils 03/16/2018 1 1     Vitamin D, 25-Hydroxy, S* 03/16/2018 24*    SL AMB TSH W/ REFLEX TO * 03/16/2018 1 42      Imaging: No results found  Review of Systems:  Review of Systems   Constitutional: Negative for fatigue and unexpected weight change  Respiratory: Positive for shortness of breath  Negative for cough, chest tightness, wheezing and stridor  Cardiovascular: Positive for palpitations  Negative for chest pain and leg swelling  Gastrointestinal: Negative for abdominal pain  Genitourinary: Negative for hematuria  Musculoskeletal: Negative for arthralgias, joint swelling and myalgias  Skin: Negative for pallor  Allergic/Immunologic: Negative for immunocompromised state  Neurological: Negative for dizziness and syncope  Hematological: Does not bruise/bleed easily  Psychiatric/Behavioral: Negative for confusion  Physical Exam:  Physical Exam   Constitutional: He is oriented to person, place, and time  He appears well-developed and well-nourished  No distress  Eyes: No scleral icterus  Neck: No JVD present  No tracheal deviation present  Cardiovascular: Normal rate, regular rhythm, normal heart sounds and intact distal pulses  Exam reveals no gallop and no friction rub  No murmur heard  Pulmonary/Chest: Effort normal and breath sounds normal  No stridor  No respiratory distress  He has no wheezes  He has no rales  He exhibits no tenderness  Abdominal: Soft  Bowel sounds are normal  He exhibits no distension and no mass  There is no tenderness  There is no rebound and no guarding  Neurological: He is alert and oriented to person, place, and time  Skin: Skin is warm and dry  He is not diaphoretic  Psychiatric: He has a normal mood and affect   His behavior is normal  Judgment and thought content normal     EKG normal sinus rhythm, normal EKG    Discussion/Summary:  Exertional dyspnea  Favor noninvasive evaluation  Further recommendations pending results of the testing  Anticipate to be unrevealing

## 2018-05-11 LAB
BASOPHILS # BLD AUTO: 41 CELLS/UL (ref 0–200)
BASOPHILS NFR BLD AUTO: 1.1 %
EOSINOPHIL # BLD AUTO: 189 CELLS/UL (ref 15–500)
EOSINOPHIL NFR BLD AUTO: 5.1 %
ERYTHROCYTE [DISTWIDTH] IN BLOOD BY AUTOMATED COUNT: 12.3 % (ref 11–15)
HCT VFR BLD AUTO: 45.3 % (ref 38.5–50)
HGB BLD-MCNC: 15.8 G/DL (ref 13.2–17.1)
LYMPHOCYTES # BLD AUTO: 1421 CELLS/UL (ref 850–3900)
LYMPHOCYTES NFR BLD AUTO: 38.4 %
MCH RBC QN AUTO: 32.4 PG (ref 27–33)
MCHC RBC AUTO-ENTMCNC: 34.9 G/DL (ref 32–36)
MCV RBC AUTO: 92.8 FL (ref 80–100)
MONOCYTES # BLD AUTO: 248 CELLS/UL (ref 200–950)
MONOCYTES NFR BLD AUTO: 6.7 %
NEUTROPHILS # BLD AUTO: 1802 CELLS/UL (ref 1500–7800)
NEUTROPHILS NFR BLD AUTO: 48.7 %
PLATELET # BLD AUTO: 182 THOUSAND/UL (ref 140–400)
PMV BLD REES-ECKER: 10.3 FL (ref 7.5–12.5)
RBC # BLD AUTO: 4.88 MILLION/UL (ref 4.2–5.8)
WBC # BLD AUTO: 3.7 THOUSAND/UL (ref 3.8–10.8)

## 2018-05-14 LAB
M TB IFN-G CD4+ BCKGRND COR BLD-ACNC: 0.15 IU/ML
M TB IFN-G CD4+ T-CELLS BLD-ACNC: 0.03 IU/ML
M TB TUBERC IFN-G BLD QL: NEGATIVE
M TB TUBERC IGNF/MITOGEN IGNF CONTROL: 7.91 IU/ML

## 2018-05-15 ENCOUNTER — TELEPHONE (OUTPATIENT)
Dept: FAMILY MEDICINE CLINIC | Facility: CLINIC | Age: 43
End: 2018-05-15

## 2018-05-15 NOTE — TELEPHONE ENCOUNTER
----- Message from Tremaine Jackson MD sent at 5/14/2018  9:28 PM EDT -----  Can you please let patient know that his QuantiFERON gold test was negative    Thank you

## 2018-05-18 ENCOUNTER — TELEPHONE (OUTPATIENT)
Dept: FAMILY MEDICINE CLINIC | Facility: CLINIC | Age: 43
End: 2018-05-18

## 2018-05-18 DIAGNOSIS — D72.819 LEUKOPENIA, UNSPECIFIED TYPE: Primary | ICD-10-CM

## 2018-05-18 NOTE — TELEPHONE ENCOUNTER
----- Message from Naeem Lucero MD sent at 5/18/2018  2:50 PM EDT -----  Can you please let patient know that his white count improved from 3 6 to 3 7  Normal range is 3 8-10 8  I would like to recheck this again in 1 month  If it persistently is lower, will refer to specialist for further evaluation    I will print out new blood work Rx for thank you

## 2018-05-18 NOTE — PROGRESS NOTES
Can you please let patient know that his white count improved from 3 6 to 3 7  Normal range is 3 8-10 8  I would like to recheck this again in 1 month  If it persistently is lower, will refer to specialist for further evaluation    I will print out new blood work Rx for thank you

## 2018-05-30 ENCOUNTER — OFFICE VISIT (OUTPATIENT)
Dept: GASTROENTEROLOGY | Facility: CLINIC | Age: 43
End: 2018-05-30
Payer: COMMERCIAL

## 2018-05-30 VITALS
TEMPERATURE: 97.3 F | BODY MASS INDEX: 29.16 KG/M2 | DIASTOLIC BLOOD PRESSURE: 91 MMHG | SYSTOLIC BLOOD PRESSURE: 143 MMHG | WEIGHT: 220 LBS | HEART RATE: 70 BPM | HEIGHT: 73 IN

## 2018-05-30 DIAGNOSIS — R13.10 FOOD STICKS ON SWALLOWING: ICD-10-CM

## 2018-05-30 PROCEDURE — 99243 OFF/OP CNSLTJ NEW/EST LOW 30: CPT | Performed by: INTERNAL MEDICINE

## 2018-05-30 NOTE — LETTER
May 30, 2018     Katrina Quinones, [address] [address]    Patient: Lei Fountain   YOB: 1975   Date of Visit: 5/30/2018       Dear Dr Morgan Figueredo:    Thank you for referring Lei Fountain to me for evaluation  Below are my notes for this consultation  If you have questions, please do not hesitate to call me  I look forward to following your patient along with you  Sincerely,        Jl Booth MD        CC: No Recipients  Jl Booth MD  5/30/2018  1:48 PM  Sign at close encounter  [facility] - Outpatient Consultation  Lei Fountain 43 y o  male MRN: [id number]  Encounter: [id number]          ASSESSMENT AND PLAN:      1  Dysphagia -  - Mr Kary Moss presents with complaints of dysphagia that has been ongoing for 15 years  He had an EGD many years ago, but no biopsies were taken and no diagnoses were made  He reports seasonal and pet allergies   - differential to include but is not limited to eosinophilic esophagitis, esophageal stricture and silent reflux   - I will schedule an EGD  I discussed with him the risks of the procedure including infection, bleeding, and perforation  I explained that if he is diagnosed with eosinophilic esophagitis, we will repeat EGD in 3 months to ensure healing    - in the mean time avoid chicken, steak, pork, and bread  - I will start him on PPI therapy  He will hold off on starting this until after the endoscopy   - I explained that he may elect to follow an elimination diet  I gave him information on this  He will follow up based on the results of the EGD  if diagnosis of eosinophilic esophagitis needed will follow up with PPI b i d  and repeat endoscopy  He can follow up after this in the office  ______________________________________________________________________    HPI:    Lei Fountain is a 43 y o  male who presents with complaints of dysphagia that has been ongoing for 15 years   He had an EGD many years ago, but no biopsies were taken  He is usually able to facilitate the passage of food by drinking water, but his last 3 episodes, drinking water did not help  He reports pet allergies and seasonal allergies  REVIEW OF SYSTEMS:    CONSTITUTIONAL: Denies any fever, chills, rigors, and weight loss  HEENT: No earache or tinnitus  Denies hearing loss or visual disturbances  CARDIOVASCULAR: No chest pain or palpitations  RESPIRATORY: Denies any cough, hemoptysis  GASTROINTESTINAL: As noted in the History of Present Illness  GENITOURINARY: No problems with urination  Denies any hematuria or dysuria  NEUROLOGIC: No dizziness or vertigo, denies headaches  MUSCULOSKELETAL: Denies any muscle or joint pain  SKIN: Denies skin rashes or itching  ENDOCRINE: Denies excessive thirst  Denies intolerance to heat or cold  PSYCHOSOCIAL: Denies depression  Denies any recent memory loss  Historical Information   Past Medical History:   Diagnosis Date    Fatigue     Generalized anxiety disorder     Hypertension     SOBOE (shortness of breath on exertion)      Past Surgical History:   Procedure Laterality Date    ESOPHAGOGASTRODUODENOSCOPY       Social History   History   Alcohol Use    Yes     Comment: Social     History   Drug Use No     History   Smoking Status    Never Smoker   Smokeless Tobacco    Never Used     Family History   Problem Relation Age of Onset    Hypertension Mother     Gout Father        Meds/Allergies       Current Outpatient Prescriptions:     cholecalciferol (VITAMIN D3) 1,000 units tablet    losartan (COZAAR) 50 mg tablet    No Known Allergies        Objective     Blood pressure 143/91, pulse 70, temperature (!) 97 3 °F (36 3 °C), temperature source Tympanic, height 6' 1" (1 854 m), weight 99 8 kg (220 lb)  Body mass index is 29 03 kg/m²          PHYSICAL EXAM:      General Appearance:   Alert, cooperative, no distress   HEENT:   Normocephalic, atraumatic, anicteric    Neck:  Supple, symmetrical, trachea midline   Lungs:   Clear to auscultation bilaterally; no rales, rhonchi or wheezing; respirations unlabored    Heart[de-identified]   Regular rate and rhythm; no murmur, rub, or gallop  Abdomen:   Soft, non-tender, non-distended; normal bowel sounds; no masses, no organomegaly    Genitalia:   Deferred    Rectal:   Deferred    Extremities:  No cyanosis, clubbing or edema    Pulses:  2+ and symmetric    Skin:  No jaundice, rashes, or lesions    Lymph nodes:  No palpable cervical lymphadenopathy        Lab Results:   No visits with results within 1 Day(s) from this visit  Latest known visit with results is:   Orders Only on 05/11/2018   Component Date Value    Quantiferon(R)-TB Gold, * 05/11/2018 NEGATIVE     QuantiFERON TB Ag Value 05/11/2018 0 03     Mitogen-NIL 05/11/2018 7 91     QFT TB Ag minus Nil Value 05/11/2018 0 15          Attestation:   By signing my name below, I, Everette Ledbetter, attest that this documentation has been prepared under the direction and in the presence of Alysha Stiles MD  Electronically Signed: Lottie Herrera  05/30/2018  Marie Saleh, personally performed the services described in this documentation  All medical record entries made by the donellibe were at my direction and in my presence  I have reviewed the chart and discharge instructions and agree that the record reflects my personal performance and is accurate and complete  Alysha Stiles MD  05/30/2018

## 2018-05-30 NOTE — PROGRESS NOTES
[facility] - Outpatient Consultation  Edwin Vieira 43 y o  male MRN: [id number]  Encounter: [id number]          ASSESSMENT AND PLAN:      1  Dysphagia -  - Mr Mc Humphreys presents with complaints of dysphagia that has been ongoing for 15 years  He had an EGD many years ago, but no biopsies were taken and no diagnoses were made  He reports seasonal and pet allergies   - differential to include but is not limited to eosinophilic esophagitis, esophageal stricture and silent reflux   - I will schedule an EGD  I discussed with him the risks of the procedure including infection, bleeding, and perforation  I explained that if he is diagnosed with eosinophilic esophagitis, we will repeat EGD in 3 months to ensure healing    - in the mean time avoid chicken, steak, pork, and bread  - I will start him on PPI therapy  He will hold off on starting this until after the endoscopy   - I explained that he may elect to follow an elimination diet  I gave him information on this  He will follow up based on the results of the EGD  if diagnosis of eosinophilic esophagitis needed will follow up with PPI b i d  and repeat endoscopy  He can follow up after this in the office  ______________________________________________________________________    HPI:    Edwin Vieira is a 43 y o  male who presents with complaints of dysphagia that has been ongoing for 15 years  He had an EGD many years ago, but no biopsies were taken  He is usually able to facilitate the passage of food by drinking water, but his last 3 episodes, drinking water did not help  He reports pet allergies and seasonal allergies  REVIEW OF SYSTEMS:    CONSTITUTIONAL: Denies any fever, chills, rigors, and weight loss  HEENT: No earache or tinnitus  Denies hearing loss or visual disturbances  CARDIOVASCULAR: No chest pain or palpitations  RESPIRATORY: Denies any cough, hemoptysis     GASTROINTESTINAL: As noted in the History of Present Illness  GENITOURINARY: No problems with urination  Denies any hematuria or dysuria  NEUROLOGIC: No dizziness or vertigo, denies headaches  MUSCULOSKELETAL: Denies any muscle or joint pain  SKIN: Denies skin rashes or itching  ENDOCRINE: Denies excessive thirst  Denies intolerance to heat or cold  PSYCHOSOCIAL: Denies depression  Denies any recent memory loss  Historical Information   Past Medical History:   Diagnosis Date    Fatigue     Generalized anxiety disorder     Hypertension     SOBOE (shortness of breath on exertion)      Past Surgical History:   Procedure Laterality Date    ESOPHAGOGASTRODUODENOSCOPY       Social History   History   Alcohol Use    Yes     Comment: Social     History   Drug Use No     History   Smoking Status    Never Smoker   Smokeless Tobacco    Never Used     Family History   Problem Relation Age of Onset    Hypertension Mother     Gout Father        Meds/Allergies       Current Outpatient Prescriptions:     cholecalciferol (VITAMIN D3) 1,000 units tablet    losartan (COZAAR) 50 mg tablet    No Known Allergies        Objective     Blood pressure 143/91, pulse 70, temperature (!) 97 3 °F (36 3 °C), temperature source Tympanic, height 6' 1" (1 854 m), weight 99 8 kg (220 lb)  Body mass index is 29 03 kg/m²  PHYSICAL EXAM:      General Appearance:   Alert, cooperative, no distress   HEENT:   Normocephalic, atraumatic, anicteric      Neck:  Supple, symmetrical, trachea midline   Lungs:   Clear to auscultation bilaterally; no rales, rhonchi or wheezing; respirations unlabored    Heart[de-identified]   Regular rate and rhythm; no murmur, rub, or gallop     Abdomen:   Soft, non-tender, non-distended; normal bowel sounds; no masses, no organomegaly    Genitalia:   Deferred    Rectal:   Deferred    Extremities:  No cyanosis, clubbing or edema    Pulses:  2+ and symmetric    Skin:  No jaundice, rashes, or lesions    Lymph nodes:  No palpable cervical lymphadenopathy        Lab Results:   No visits with results within 1 Day(s) from this visit  Latest known visit with results is:   Orders Only on 05/11/2018   Component Date Value    Quantiferon(R)-TB Gold, * 05/11/2018 NEGATIVE     QuantiFERON TB Ag Value 05/11/2018 0 03     Mitogen-NIL 05/11/2018 7 91     QFT TB Ag minus Nil Value 05/11/2018 0 15          Attestation:   By signing my name below, I, Vickie Robledo, attest that this documentation has been prepared under the direction and in the presence of Boy Duenas MD  Electronically Signed: Lottie Cee  05/30/2018  Bairon Antonio, personally performed the services described in this documentation  All medical record entries made by the donellibthomas were at my direction and in my presence  I have reviewed the chart and discharge instructions and agree that the record reflects my personal performance and is accurate and complete  Boy Duenas MD  05/30/2018

## 2018-05-31 ENCOUNTER — TELEPHONE (OUTPATIENT)
Dept: FAMILY MEDICINE CLINIC | Facility: CLINIC | Age: 43
End: 2018-05-31

## 2018-05-31 NOTE — TELEPHONE ENCOUNTER
Patient called today stating his bloodpressure from yesterday's office visit with Tiajuana Shy was still elevated  It was 140/90 he is wondering if he needs to change dosage or change medication  Please advise patient at [phone]

## 2018-06-04 ENCOUNTER — OFFICE VISIT (OUTPATIENT)
Dept: FAMILY MEDICINE CLINIC | Facility: CLINIC | Age: 43
End: 2018-06-04
Payer: COMMERCIAL

## 2018-06-04 VITALS
BODY MASS INDEX: 29.29 KG/M2 | TEMPERATURE: 96.2 F | RESPIRATION RATE: 16 BRPM | HEART RATE: 68 BPM | SYSTOLIC BLOOD PRESSURE: 140 MMHG | WEIGHT: 221 LBS | HEIGHT: 73 IN | DIASTOLIC BLOOD PRESSURE: 90 MMHG

## 2018-06-04 DIAGNOSIS — I10 HYPERTENSION, UNSPECIFIED TYPE: Primary | ICD-10-CM

## 2018-06-04 DIAGNOSIS — M54.50 LOW BACK PAIN WITHOUT SCIATICA, UNSPECIFIED BACK PAIN LATERALITY, UNSPECIFIED CHRONICITY: ICD-10-CM

## 2018-06-04 DIAGNOSIS — R13.10 FOOD STICKS ON SWALLOWING: ICD-10-CM

## 2018-06-04 DIAGNOSIS — F41.9 ANXIETY: ICD-10-CM

## 2018-06-04 PROCEDURE — 3008F BODY MASS INDEX DOCD: CPT | Performed by: FAMILY MEDICINE

## 2018-06-04 PROCEDURE — 99214 OFFICE O/P EST MOD 30 MIN: CPT | Performed by: FAMILY MEDICINE

## 2018-06-04 RX ORDER — LOSARTAN POTASSIUM 50 MG/1
50 TABLET ORAL DAILY
Qty: 30 TABLET | Refills: 3 | Status: SHIPPED | OUTPATIENT
Start: 2018-06-04 | End: 2018-10-17 | Stop reason: SDUPTHER

## 2018-06-04 NOTE — PROGRESS NOTES
FAMILY PRACTICE OFFICE VISIT       NAME: Kelsy Nice  AGE: 43 y o  SEX: male       : 1975        MRN: [id number]    DATE: 2018  TIME: 4:16 PM    Assessment and Plan     Problem List Items Addressed This Visit     Hypertension - Primary       Patient's BP remains elevated  I would like to increase losartan to 50 mg from 25 mg  Continue with lifestyle modifications  May benefit from monitoring at home as well  Follow-up in the next 2-3 weeks for BP check  If BP at home remains similar to what it is in the office today, I would like patient to increase his losartan to 75 mg and let me know  Patient is asymptomatic  Relevant Medications    losartan (COZAAR) 50 mg tablet    Food sticks on swallowing      Patient was evaluated by Gastroenterology and has EGD scheduled  patient was provided Rx for PPI however was advised to hold off on starting this until EGD was done  Low back pain without sciatica       Patient suffers from chronic low back pain  No red flag signs  I feel patient would benefit from physical therapy, referral has been provided  I feel pt would also benefit from acupuncture evaluation  Will provide patient with number for this  Relevant Orders    Ambulatory referral to Physical Therapy    Anxiety      Have discussed techniques on stress reduction including breathing exercises, continuing with routine exercise, getting adequate amount of sleep  I feel patient would benefit from evaluation by our behavioral specialist/therapist, Vera Gaona  Patient will consider  There are no Patient Instructions on file for this visit  Chief Complaint     Chief Complaint   Patient presents with    Hypertension     Patient is here for his blood pressure  He has not been checking his blood pressure while being at home  History of Present Illness     HPI   68-year-old male presents for follow-up of blood pressure    Has been taking 25 mg of losartan daily  Has been exercising routinely and watching what he eats  He does admit to having anxiety  States he worries about his children, paying for their college  patient also has chronic low back pain  Patient states he fractured his right upper in high school while playing football  Has had epidural injections in the past   Has had evaluations by chiropractic care which has helped initially  Denies bowel or bladder incontinence  Denies radiation of pain to his legs or tingling, numbness  Review of Systems   Review of Systems   Constitutional: Negative for unexpected weight change  Eyes: Negative for visual disturbance  Respiratory: Negative for shortness of breath  Cardiovascular: Negative  Musculoskeletal: Positive for back pain  Neurological: Negative for weakness and numbness  Psychiatric/Behavioral: The patient is nervous/anxious  Active Problem List     Patient Active Problem List   Diagnosis    Routine health maintenance    Hypertension    Generalized anxiety disorder    Food sticks on swallowing    Fatigue    SOBOE (shortness of breath on exertion)    Low back pain without sciatica    Anxiety       Past Medical History:  Past Medical History:   Diagnosis Date    Fatigue     Generalized anxiety disorder     Hypertension     SOBOE (shortness of breath on exertion)        Past Surgical History:  Past Surgical History:   Procedure Laterality Date    ESOPHAGOGASTRODUODENOSCOPY         Family History:  Family History   Problem Relation Age of Onset    Hypertension Mother    [facility] Gout Father        Social History:  Social History     Social History    Marital status: /Civil Union     Spouse name: N/A    Number of children: N/A    Years of education: N/A     Occupational History    Not on file       Social History Main Topics    Smoking status: Never Smoker    Smokeless tobacco: Never Used    Alcohol use Yes      Comment: Social    Drug use: No    Sexual activity: Not on file     Other Topics Concern    Not on file     Social History Narrative    Exercises regularly   I have reviewed the patient's medical history in detail; there are no changes to the history as noted in the electronic medical record  Objective     Vitals:    06/04/18 1523   BP: 140/90   Pulse: 68   Resp: 16   Temp: (!) 96 2 °F (35 7 °C)     Wt Readings from Last 3 Encounters:   06/04/18 100 kg (221 lb)   05/30/18 99 8 kg (220 lb)   04/09/18 101 kg (222 lb)       Physical Exam   Constitutional: He is oriented to person, place, and time  He appears well-developed and well-nourished  HENT:   Head: Normocephalic and atraumatic  Mouth/Throat: Oropharynx is clear and moist    Eyes: Conjunctivae and EOM are normal  Pupils are equal, round, and reactive to light  Neck: Normal range of motion  Neck supple  Cardiovascular: Normal rate, regular rhythm and normal heart sounds  Pulmonary/Chest: Effort normal and breath sounds normal    Lymphadenopathy:     He has no cervical adenopathy  Neurological: He is alert and oriented to person, place, and time  Psychiatric: He has a normal mood and affect  Nursing note and vitals reviewed        Pertinent Laboratory/Diagnostic Studies:  Lab Results   Component Value Date    GLUCOSE 106 (H) 02/27/2017    BUN 16 03/16/2018    CREATININE 1 03 03/16/2018    CALCIUM 9 8 03/16/2018     02/27/2017    K 5 0 02/27/2017    CO2 27 02/27/2017    CL 98 02/27/2017     Lab Results   Component Value Date    ALT 19 02/27/2017    AST 19 02/27/2017    ALKPHOS 53 02/27/2017    BILITOT 0 4 02/27/2017       Lab Results   Component Value Date    WBC 4 0 02/27/2017    HGB 15 8 05/11/2018    HCT 45 3 05/11/2018    MCV 92 8 05/11/2018     05/11/2018       No results found for: TSH    Lab Results   Component Value Date    CHOL 184 02/27/2017     Lab Results   Component Value Date    TRIG 148 03/16/2018     Lab Results   Component Value Date    HDL 52 02/27/2017     Lab Results   Component Value Date    LDLCALC 98 02/27/2017     No results found for: HGBA1C    Results for orders placed or performed in visit on 05/11/18   QuantiFERON TB In Tube-Reflex   Result Value Ref Range    Quantiferon(R)-TB Gold, (Incubated) NEGATIVE NEGATIVE    QuantiFERON TB Ag Value 0 03 IU/mL    Mitogen-NIL 7 91 IU/mL    QFT TB Ag minus Nil Value 0 15 IU/mL       Orders Placed This Encounter   Procedures    Ambulatory referral to Physical Therapy       ALLERGIES:  No Known Allergies    Current Medications     Current Outpatient Prescriptions   Medication Sig Dispense Refill    cholecalciferol (VITAMIN D3) 1,000 units tablet Take 1,000 Units by mouth daily      losartan (COZAAR) 50 mg tablet Take 1 tablet (50 mg total) by mouth daily 30 tablet 3     No current facility-administered medications for this visit            Health Maintenance     Health Maintenance   Topic Date Due    HIV SCREENING  1975    INFLUENZA VACCINE  09/01/2018    Depression Screening PHQ-9  03/19/2019    DTaP,Tdap,and Td Vaccines (8 - Td) 02/15/2027     Immunization History   Administered Date(s) Administered    DT (pediatric) 07/19/1990    DTP 1975, 02/13/1976, 05/18/1976, 12/21/1976, 06/03/1981    Hep B, Adolescent or Pediatric 04/01/1993, 05/08/1993, 05/06/1994    MMR 09/29/1976, 06/22/1982    OPV 01/13/1976, 03/17/1976, 06/22/1976, 04/04/1977    Tdap 06/16/2006, 02/15/2017       Tremaine Jackson MD

## 2018-06-05 PROBLEM — F41.9 ANXIETY: Status: ACTIVE | Noted: 2018-06-05

## 2018-06-05 PROBLEM — M54.50 LOW BACK PAIN WITHOUT SCIATICA: Status: ACTIVE | Noted: 2018-06-05

## 2018-06-05 NOTE — ASSESSMENT & PLAN NOTE
Patient was evaluated by Gastroenterology and has EGD scheduled  patient was provided Rx for PPI however was advised to hold off on starting this until EGD was done

## 2018-06-05 NOTE — ASSESSMENT & PLAN NOTE
Patient suffers from chronic low back pain  No red flag signs  I feel patient would benefit from physical therapy, referral has been provided  I feel pt would also benefit from acupuncture evaluation  Will provide patient with number for this

## 2018-06-05 NOTE — ASSESSMENT & PLAN NOTE
Have discussed techniques on stress reduction including breathing exercises, continuing with routine exercise, getting adequate amount of sleep  I feel patient would benefit from evaluation by our behavioral specialist/therapist, Kelly Dill  Patient will consider

## 2018-06-05 NOTE — ASSESSMENT & PLAN NOTE
Patient's BP remains elevated  I would like to increase losartan to 50 mg from 25 mg  Continue with lifestyle modifications  May benefit from monitoring at home as well  Follow-up in the next 2-3 weeks for BP check  If BP at home remains similar to what it is in the office today, I would like patient to increase his losartan to 75 mg and let me know  Patient is asymptomatic

## 2018-06-14 ENCOUNTER — ANESTHESIA EVENT (OUTPATIENT)
Dept: GASTROENTEROLOGY | Facility: MEDICAL CENTER | Age: 43
End: 2018-06-14
Payer: COMMERCIAL

## 2018-06-15 ENCOUNTER — HOSPITAL ENCOUNTER (OUTPATIENT)
Facility: MEDICAL CENTER | Age: 43
Setting detail: OUTPATIENT SURGERY
Discharge: HOME | End: 2018-06-15
Attending: INTERNAL MEDICINE | Admitting: INTERNAL MEDICINE
Payer: COMMERCIAL

## 2018-06-15 ENCOUNTER — ANESTHESIA (OUTPATIENT)
Dept: GASTROENTEROLOGY | Facility: MEDICAL CENTER | Age: 43
End: 2018-06-15
Payer: COMMERCIAL

## 2018-06-15 VITALS
TEMPERATURE: 98.6 F | HEIGHT: 73 IN | DIASTOLIC BLOOD PRESSURE: 77 MMHG | HEART RATE: 71 BPM | OXYGEN SATURATION: 98 % | SYSTOLIC BLOOD PRESSURE: 120 MMHG | RESPIRATION RATE: 20 BRPM | WEIGHT: 220 LBS | BODY MASS INDEX: 29.16 KG/M2

## 2018-06-15 DIAGNOSIS — K22.2 ESOPHAGEAL STRICTURE: Primary | ICD-10-CM

## 2018-06-15 DIAGNOSIS — R13.10 FOOD STICKS ON SWALLOWING: ICD-10-CM

## 2018-06-15 PROCEDURE — 88305 TISSUE EXAM BY PATHOLOGIST: CPT | Performed by: PATHOLOGY

## 2018-06-15 PROCEDURE — 43239 EGD BIOPSY SINGLE/MULTIPLE: CPT | Performed by: INTERNAL MEDICINE

## 2018-06-15 PROCEDURE — 43249 ESOPH EGD DILATION <30 MM: CPT | Performed by: INTERNAL MEDICINE

## 2018-06-15 RX ORDER — PROPOFOL 10 MG/ML
INJECTION, EMULSION INTRAVENOUS AS NEEDED
Status: DISCONTINUED | OUTPATIENT
Start: 2018-06-15 | End: 2018-06-15 | Stop reason: SURG

## 2018-06-15 RX ORDER — SODIUM CHLORIDE 9 MG/ML
125 INJECTION, SOLUTION INTRAVENOUS CONTINUOUS
Status: DISCONTINUED | OUTPATIENT
Start: 2018-06-15 | End: 2018-06-15 | Stop reason: HOSPADM

## 2018-06-15 RX ORDER — OMEPRAZOLE 20 MG/1
40 CAPSULE, DELAYED RELEASE ORAL 2 TIMES DAILY
Qty: 60 CAPSULE | Refills: 5 | Status: SHIPPED | OUTPATIENT
Start: 2018-06-15 | End: 2018-11-15 | Stop reason: SDUPTHER

## 2018-06-15 RX ADMIN — LIDOCAINE HYDROCHLORIDE 60 MG: 20 INJECTION, SOLUTION INTRAVENOUS at 12:38

## 2018-06-15 RX ADMIN — SODIUM CHLORIDE 125 ML/HR: 0.9 INJECTION, SOLUTION INTRAVENOUS at 11:53

## 2018-06-15 RX ADMIN — PROPOFOL 20 MG: 10 INJECTION, EMULSION INTRAVENOUS at 12:39

## 2018-06-15 RX ADMIN — PROPOFOL 20 MG: 10 INJECTION, EMULSION INTRAVENOUS at 12:42

## 2018-06-15 RX ADMIN — PROPOFOL 20 MG: 10 INJECTION, EMULSION INTRAVENOUS at 12:40

## 2018-06-15 RX ADMIN — PROPOFOL 120 MG: 10 INJECTION, EMULSION INTRAVENOUS at 12:38

## 2018-06-15 NOTE — ANESTHESIA PREPROCEDURE EVALUATION
Please have the pt discuss with his endocrinologist or PCP if the Fiez can be stopped while he is on treatment for the infection as he has limited choices regarding antibiotic selection. I am not sure if this is the reason for the elevation in CPK. His CPK is elevated but he does not have any symptoms and his renal function is unaffected. If this is not possible then we will have to stop the Daptomycin and start Ceftaroline 600 mg IV q 12 hrs. Results for Margarita Bernal (MRN [id number]) as of 12/21/2017 10:59   Ref. Range 12/18/2017 09:00 12/20/2017 15:07   CPK Latest Ref Range: 39 - 308 Units/L 1,665 (H) 1,326 (H)     For now continue to hold the Daptomycin. Repeat IV fluids 500 ml over 1 hr today and tomorrow and then repeat CPK at that time. If pt is asymptomatic the dapto can be restarted with repeat CPK on Tues and pt encouraged to drink greater than 8 glasses of water a day. Review of Systems/Medical History  Patient summary reviewed  Chart reviewed      Cardiovascular  Hypertension controlled,    Pulmonary  Negative pulmonary ROS No shortness of breath,        GI/Hepatic  Dysphagia, Dysphagia type: solids    Comment: FOOD STICKS WITH SWALLOWING     Negative  ROS        Endo/Other     GYN       Hematology  Negative hematology ROS      Musculoskeletal    Comment: VINICIUS C6-7      Neurology  Negative neurology ROS      Psychology   Anxiety,              Physical Exam    Airway    Mallampati score: I  TM Distance: <3 FB  Neck ROM: full     Dental   No notable dental hx     Cardiovascular  Rhythm: regular, Rate: normal, Cardiovascular exam normal    Pulmonary  Pulmonary exam normal     Other Findings        Anesthesia Plan  ASA Score- 2     Anesthesia Type- IV sedation with anesthesia with ASA Monitors  Additional Monitors:   Airway Plan:         Plan Factors- Patient instructed to abstain from smoking on day of procedure  Patient did not smoke on day of surgery  Induction- intravenous  Postoperative Plan-     Informed Consent- Anesthetic plan and risks discussed with patient

## 2018-06-15 NOTE — DISCHARGE INSTRUCTIONS
Upper Endoscopy   WHAT YOU NEED TO KNOW:   An upper endoscopy is also called an upper gastrointestinal (GI) endoscopy, or an esophagogastroduodenoscopy (EGD)  You may feel bloated, gassy, or have some abdominal discomfort after your procedure  Your throat may be sore for 24 to 36 hours  You may burp or pass gas from air that is still inside your body  DISCHARGE INSTRUCTIONS:   Call 911 if:   · You have sudden chest pain or trouble breathing  Seek care immediately if:   · You feel dizzy or faint  · You have trouble swallowing  · You have severe throat pain  · Your bowel movements are very dark or black  · Your abdomen is hard and firm and you have severe pain  · You vomit blood  Contact your healthcare provider if:   · You feel full or bloated and cannot burp or pass gas  · You have not had a bowel movement for 3 days after your procedure  · You have neck pain  · You have a fever or chills  · You have nausea or are vomiting  · You have a rash or hives  · You have questions or concerns about your endoscopy  Relieve a sore throat:  Suck on throat lozenges or crushed ice  Gargle with a small amount of warm salt water  Mix 1 teaspoon of salt and 1 cup of warm water to make salt water  Relieve gas and discomfort from bloating:  Lie on your right side with a heating pad on your abdomen  Take short walks to help pass gas  Eat small meals until bloating is relieved  Rest after your procedure:  Do not drive or make important decisions until the day after your procedure  Return to your normal activity as directed  You can usually return to work the day after your procedure  Follow up with your healthcare provider as directed:  Write down your questions so you remember to ask them during your visits  © 2017 [employer] Information is for End User's use only and may not be sold, redistributed or otherwise used for commercial purposes   All illustrations and images included in CareNotes® are the copyrighted property of [employer]  or [employer]ar[employer]ar[employer]  The above information is an  only  It is not intended as medical advice for individual conditions or treatments  Talk to your doctor, nurse or pharmacist before following any medical regimen to see if it is safe and effective for you  Esophageal Stricture   WHAT YOU NEED TO KNOW:   Esophageal stricture is a narrowing of your esophagus  Inflammation or damage to your esophagus may cause scar tissue that leads to narrowing  DISCHARGE INSTRUCTIONS:   Medicines:   · Medicines  may be given to decrease stomach acid  · Take your medicine as directed  Contact your healthcare provider if you think your medicine is not helping or if you have side effects  Tell him if you are allergic to any medicine  Keep a list of the medicines, vitamins, and herbs you take  Include the amounts, and when and why you take them  Bring the list or the pill bottles to follow-up visits  Carry your medicine list with you in case of an emergency  Follow up with your healthcare provider as directed: You may need to return for more tests  Write down your questions so you remember to ask them during your visits  Nutrition:  You may not be able to eat solid foods for a period of time  You may be allowed to drink water, broth, apple juice, or lemon-lime soft drinks  You may also suck on ice chips or eat gelatin  As you improve, you may be given soft foods to eat or thickened liquids to drink  You may return to eating normal foods as your swallowing gets better  Ask for more information about the type of foods you should eat  Rest as needed:  Slowly start to do more each day  Return to your daily activities as directed  Contact your healthcare provider if:   · You have a fever  · You are vomiting and cannot keep any food or liquids down  · You feel very full and cannot burp or vomit      · You have pain that does not decrease even after you take medicine  · Your symptoms get worse  · You have questions or concerns about your condition or care  Seek care immediately or call 911 if:   · You have severe chest pain and sudden trouble breathing  · Your bowel movements are black, bloody, or tarry-looking  · Your vomit looks like coffee grounds or has blood in it  © 2017 [address] Information is for End User's use only and may not be sold, redistributed or otherwise used for commercial purposes  All illustrations and images included in CareNotes® are the copyrighted property of A D [employer] M , Inc  or [employer]ar[employer]rizar[employer]  The above information is an  only  It is not intended as medical advice for individual conditions or treatments  Talk to your doctor, nurse or pharmacist before following any medical regimen to see if it is safe and effective for you  Esophageal Dilation   WHAT YOU NEED TO KNOW:   Esophageal dilation is a procedure to widen a narrow part of your esophagus  Your healthcare provider will use a dilator (inflatable balloon or another tool that expands) to make the area wider  He may also do an endoscopy before or during your esophageal dilation  During an endoscopy, your healthcare provider will use a scope to see inside your esophagus  DISCHARGE INSTRUCTIONS:   Medicines:   · Medicines  may be given to decrease stomach acid that can irritate your esophagus  · Take your medicine as directed  Contact your healthcare provider if you think your medicine is not helping or if you have side effects  Tell him if you are allergic to any medicine  Keep a list of the medicines, vitamins, and herbs you take  Include the amounts, and when and why you take them  Bring the list or the pill bottles to follow-up visits  Carry your medicine list with you in case of an emergency    Follow up with your healthcare provider as directed:  Write down your questions so you remember to ask them during your visits  Nutrition:  You may eat foods you normally eat  Chew your food well  Eat soft foods if you still have problems swallowing  Soft foods include applesauce, bananas, cooked cereal, cottage cheese, eggs, pudding, and yogurt  Ask for more information on what types of food to eat  Contact your healthcare provider if:   · You have a fever  · You feel very full or bloated  · You have more problems swallowing food  · You have nausea or are vomiting  · You have questions or concerns about your condition or care  Seek care immediately or call 911 if:   · You vomit blood  · You are not able to swallow any food  · You have a fast heartbeat, chest pain, or sudden trouble breathing  · Your abdomen suddenly becomes tender and hard  © 2017 [address] Information is for End User's use only and may not be sold, redistributed or otherwise used for commercial purposes  All illustrations and images included in CareNotes® are the copyrighted property of [employer] , Inc  or Margarito Irizarry  The above information is an  only  It is not intended as medical advice for individual conditions or treatments  Talk to your doctor, nurse or pharmacist before following any medical regimen to see if it is safe and effective for you

## 2018-06-15 NOTE — OP NOTE
**** GI/ENDOSCOPY REPORT ****     PATIENT NAME: Sherlyn Moody - VISIT ID:  Patient ID: [id number]   YOB: 1975     INTRODUCTION: Esophagogastroduodenoscopy - A 43 male patient presents for   an outpatient Esophagogastroduodenoscopy at [address] [place] [address]  INDICATIONS: Dysphagia (not on PPi)  CONSENT: The benefits, risks, and alternatives to the procedure were   discussed and informed consent was obtained from the patient  PREPARATION:  EKG, pulse, pulse oximetry and blood pressure were monitored   throughout the procedure  MEDICATIONS:     PROCEDURE:  The endoscope was passed without difficulty through the mouth   under direct visualization and advanced to the 2nd portion of the   duodenum  The scope was withdrawn and the mucosa was carefully examined  FINDINGS:   Esophagus: A stricture was found in the distal third of the   esophagus  Dilatation was performed, using a balloon  A 15-16 5-18 mm   dilator was passed  Multiple random biopsies was taken from the proximal   third of the esophagus  Stomach: The stomach appeared to be normal     Pylorus: The pylorus appeared to be normal, symmetrical, and patent  Duodenum: The duodenum appeared to be normal      COMPLICATIONS: There were no complications  IMPRESSIONS: Esophageal stricture present in the distal third of the   esophagus  Dilatation was performed  Normal stomach  Normal, symmetrical,   and patent pylorus  Normal duodenum  RECOMMENDATIONS: Anti-reflux measures: Raise the head of the bed 4 to 6   inches  Avoid smoking  Avoid excess coffee, tea or other caffeinated   beverages  Avoid garments that fit tightly through the abdomen  Avoid   eating before bed  EGD recommended in 3 months  Follow-up on the results   of the biopsy specimens  Begin medication as prescribed  Begin taking   omeprazole (Prilosec) 40 mg PO twice a day       ESTIMATED BLOOD LOSS:     PATHOLOGY SPECIMENS: Multiple random biopsies taken from the proximal   third of the esophagus  PROCEDURE CODES: 24470 - EGD flexible; with biopsy 76437 - EGD flexible;   with balloon dilation (< 30mm diameter)     ICD-9 Codes: 787 2 DYSPHAGIA 530 3 Stricture and stenosis of esophagus     ICD-10 Codes: R13 10 Dysphagia, unspecified K22 2 Esophageal obstruction     PERFORMED BY: JOLIE Staples  on 06/15/2018  Version 1, electronically signed by JOLIE Peters  on 06/15/2018 at   12:59

## 2018-06-20 ENCOUNTER — TELEPHONE (OUTPATIENT)
Dept: GASTROENTEROLOGY | Facility: CLINIC | Age: 43
End: 2018-06-20

## 2018-06-20 NOTE — TELEPHONE ENCOUNTER
Dr Nichelle England pt    Pt called in responding to dr Nichelle England missed call for results  Please return his call when possible     [phone]

## 2018-10-17 DIAGNOSIS — I10 HYPERTENSION, UNSPECIFIED TYPE: ICD-10-CM

## 2018-10-18 RX ORDER — LOSARTAN POTASSIUM 50 MG/1
50 TABLET ORAL DAILY
Qty: 90 TABLET | Refills: 1 | Status: SHIPPED | OUTPATIENT
Start: 2018-10-18 | End: 2018-12-19

## 2018-11-15 DIAGNOSIS — K22.2 ESOPHAGEAL STRICTURE: ICD-10-CM

## 2018-11-15 RX ORDER — OMEPRAZOLE 20 MG/1
CAPSULE, DELAYED RELEASE ORAL
Qty: 60 CAPSULE | Refills: 5 | Status: SHIPPED | OUTPATIENT
Start: 2018-11-15 | End: 2019-03-14 | Stop reason: SDUPTHER

## 2018-12-07 ENCOUNTER — TELEPHONE (OUTPATIENT)
Dept: FAMILY MEDICINE CLINIC | Facility: CLINIC | Age: 43
End: 2018-12-07

## 2018-12-07 NOTE — TELEPHONE ENCOUNTER
Patient will just cut losartan in half to total 75mg  He has an appointment on 12/19 for a blood pressure check with you  He will continue to monitor his bloodpressure at home

## 2018-12-07 NOTE — TELEPHONE ENCOUNTER
RE: losartan (COZAAR) 50 mg tablet       Patient wants to switch is blood pressure medication again because it's not working  No matter when he checks his pressure it's 140 something over 90 something  He thinks he has to try 3 different meds before he can go back to the [facility] with it being a lower cost to him per his insurance co  & the Losartan is #2  He will try anything for the 3 medication to see if it works or not  Please call to advise       [facility] [place]

## 2018-12-07 NOTE — TELEPHONE ENCOUNTER
We usually increase the dose of the current blood pressure medication he is on first before changing to another bp med  I would like to increase his losartan to 75 mg  He would have to take one 50 mg tablet and one 25 mg tablet totaling 75 mg daily  I would like him to come in in approximately 1-2 weeks for blood pressure check  If the increase in losartan helps, then we can keep him on the 75 mg  The highest dose of losartan is 100 mg

## 2018-12-19 ENCOUNTER — OFFICE VISIT (OUTPATIENT)
Dept: FAMILY MEDICINE CLINIC | Facility: CLINIC | Age: 43
End: 2018-12-19
Payer: COMMERCIAL

## 2018-12-19 VITALS
SYSTOLIC BLOOD PRESSURE: 130 MMHG | HEART RATE: 80 BPM | HEIGHT: 73 IN | TEMPERATURE: 98 F | RESPIRATION RATE: 16 BRPM | DIASTOLIC BLOOD PRESSURE: 88 MMHG | WEIGHT: 220.4 LBS | BODY MASS INDEX: 29.21 KG/M2

## 2018-12-19 DIAGNOSIS — J06.9 UPPER RESPIRATORY TRACT INFECTION, UNSPECIFIED TYPE: ICD-10-CM

## 2018-12-19 DIAGNOSIS — R20.2 NUMBNESS AND TINGLING: ICD-10-CM

## 2018-12-19 DIAGNOSIS — R23.2 FACIAL FLUSHING: ICD-10-CM

## 2018-12-19 DIAGNOSIS — R20.0 NUMBNESS AND TINGLING: ICD-10-CM

## 2018-12-19 DIAGNOSIS — I10 HYPERTENSION, UNSPECIFIED TYPE: Primary | ICD-10-CM

## 2018-12-19 DIAGNOSIS — M54.2 NECK PAIN: ICD-10-CM

## 2018-12-19 PROCEDURE — 99214 OFFICE O/P EST MOD 30 MIN: CPT | Performed by: FAMILY MEDICINE

## 2018-12-19 PROCEDURE — 3008F BODY MASS INDEX DOCD: CPT | Performed by: FAMILY MEDICINE

## 2018-12-19 PROCEDURE — 1036F TOBACCO NON-USER: CPT | Performed by: FAMILY MEDICINE

## 2018-12-19 PROCEDURE — 3075F SYST BP GE 130 - 139MM HG: CPT | Performed by: FAMILY MEDICINE

## 2018-12-19 PROCEDURE — 3079F DIAST BP 80-89 MM HG: CPT | Performed by: FAMILY MEDICINE

## 2018-12-19 RX ORDER — LOSARTAN POTASSIUM 100 MG/1
100 TABLET ORAL DAILY
Qty: 30 TABLET | Refills: 3 | Status: SHIPPED | OUTPATIENT
Start: 2018-12-19 | End: 2019-01-14

## 2018-12-19 RX ORDER — AMOXICILLIN 500 MG/1
500 CAPSULE ORAL EVERY 12 HOURS SCHEDULED
Qty: 14 CAPSULE | Refills: 0 | Status: SHIPPED | OUTPATIENT
Start: 2018-12-19 | End: 2018-12-26

## 2018-12-19 NOTE — PROGRESS NOTES
FAMILY PRACTICE OFFICE VISIT       NAME: Shannon Rocha  AGE: 37 y o  SEX: male       : 1975        MRN: [id number]    DATE: 2018  TIME: 7:53 AM    Assessment and Plan     Problem List Items Addressed This Visit     Hypertension - Primary    Relevant Medications    losartan (COZAAR) 100 MG tablet    Other Relevant Orders    Comprehensive metabolic panel    Neck pain    Relevant Orders    Ambulatory referral to Pain Management    MRI cervical spine wo contrast    Numbness and tingling    Relevant Orders    Ambulatory referral to Pain Management    Vitamin B12    MRI cervical spine wo contrast    Upper respiratory tract infection    Relevant Medications    amoxicillin (AMOXIL) 500 mg capsule    Facial flushing    Relevant Orders    TSH, 3rd generation with Free T4 reflex        Hypertension:  I will go ahead increase patient's losartan to 100 mg daily  Continue with lifestyle modifications  I will also go ahead and work on prior authorization for Benicar as it seems this has been working the best to control patient's blood pressure  He has also been experiencing facial flushing which he feels may be secondary to losartan? He is currently asymptomatic and denies chest pain, shortness of breath, headaches or anything else that is concerning  I would like patient to return in 2 weeks for blood pressure check  Neck pain, numbness and tingling:  Patient has extensive history of her disc herniation in cervical spine, has seen pain management in the past and has received epidural injections  Will recently in the last 1/2 months, patient has started to experience numbness in the 4th and 5th digits of both hands as well as shoulders and arms falling asleep and feeling heavy  Patient is concerned as he does have a prior history and would like to have further evaluation of this  Because of patient's extensive history, I would like to obtain MRI cervical spine    I will also go ahead and refer to pain management  Patient is agreeable with this plan  If he should develop any bowel or bladder incontinence, or anything else that is concerning, I have asked him to go to the emergency room  URI symptoms:  Likely viral in etiology  Will provide patient with Rx for amoxicillin to fill if symptoms persist greater than 7 days  Continue with symptomatic treatment and supportive measures including adequate hydration  There are no Patient Instructions on file for this visit  I have spent 25 minutes with Patient  today in which greater than 50% of this time was spent in counseling/coordination of care regarding Diagnostic results, Prognosis, Risks and benefits of tx options, Intructions for management, Patient and family education, Importance of tx compliance, Risk factor reductions and Impressions  Chief Complaint     Chief Complaint   Patient presents with    Follow-up     BP check and sinus congestion       History of Present Illness     HPI   27-year-old male here for follow-up of blood pressure and discuss neck pain  Has been taking losartan 75 mg her daily, bp remains unchanged, still at 140/90  Was previously on lisinopril which also did not help with controlling bp  Pt states the only med that has helped is benicar brand name 20 mg  He also adds, he has been experiencing facial flushing and feels it may be from losartan? Has been exercising, running 2 miles and walking 3 miles on treadmill 5 days per week    Re  H/o food being stuck, patient was noted to have a stricture which required dilatation in June by CORDELIA MARTÍNEZ  Patient does not have any issues with food being stuck any longer      continues to take ppi   no further difficulty swallowing    Patient states he has also been experiencing Nasal congestion, fullness of ears, pnd, runny nose since yesterday  Took nyqil and dayquil  Son has been sick      Has had a h/o neck pain X 10 yrs  Used to see pain mgmt for neck pain, back pain   has had 3 rounds of epidural injections   Has h/o c5,6,7 herniated discs   after injections, pain is relieved   More recently in the past 1/2 months, patient has started to experience numbness in the 4th and 5th digits of both hands as well as shoulders and arms falling asleep and feeling heavy  Patient is concerned as he does have a prior history and would like to have further evaluation of this  Denies bowel or bladder incontinence  Denies weakness at present  Review of Systems   Review of Systems   Constitutional: Negative for chills, fever and unexpected weight change  HENT: Positive for congestion, postnasal drip and rhinorrhea  Eyes: Negative for visual disturbance  Respiratory: Negative for shortness of breath  Cardiovascular: Negative  Gastrointestinal: Negative for abdominal pain  Musculoskeletal: Positive for neck pain  Neurological: Positive for numbness  Psychiatric/Behavioral: Negative for dysphoric mood and sleep disturbance  Active Problem List     Patient Active Problem List   Diagnosis    Routine health maintenance    Hypertension    Generalized anxiety disorder    Food sticks on swallowing    Fatigue    SOBOE (shortness of breath on exertion)    Low back pain without sciatica    Anxiety    Neck pain    Numbness and tingling    Upper respiratory tract infection    Facial flushing       Past Medical History:  Past Medical History:   Diagnosis Date    Cervical disc disorder     c6,c7, epidural injections for pain management    Fatigue     Generalized anxiety disorder     pt denies    Hypertension     SOBOE (shortness of breath on exertion)     pt denies       Past Surgical History:  Past Surgical History:   Procedure Laterality Date    ESOPHAGOGASTRODUODENOSCOPY      MO ESOPHAGOGASTRODUODENOSCOPY TRANSORAL DIAGNOSTIC N/A 6/15/2018    Procedure: ESOPHAGOGASTRODUODENOSCOPY (EGD); Surgeon: Droita Gregory MD;  Location: [facility] GI [facility];   Service: Gastroenterology Family History:  Family History   Problem Relation Age of Onset    Hypertension Mother     Gout Father        Social History:  Social History     Social History    Marital status: /Civil Union     Spouse name: N/A    Number of children: N/A    Years of education: N/A     Occupational History    Not on file  Social History Main Topics    Smoking status: Never Smoker    Smokeless tobacco: Never Used    Alcohol use Yes      Comment: Social    Drug use: No    Sexual activity: Not on file     Other Topics Concern    Not on file     Social History Narrative    Exercises regularly     I have reviewed the patient's medical history in detail; there are no changes to the history as noted in the electronic medical record  Objective     Vitals:    12/19/18 0856   BP: 130/88   Pulse:    Resp:    Temp:      Wt Readings from Last 3 Encounters:   12/19/18 100 kg (220 lb 6 4 oz)   06/15/18 99 8 kg (220 lb)   06/04/18 100 kg (221 lb)     Vitals:    12/19/18 0827 12/19/18 0856   BP: 140/90 130/88   Pulse: 80    Resp: 16    Temp: 98 °F (36 7 °C)    TempSrc: Tympanic    Weight: 100 kg (220 lb 6 4 oz)    Height: 6' 1" (1 854 m)            Physical Exam   Constitutional: He is oriented to person, place, and time  He appears well-developed and well-nourished  HENT:   Head: Normocephalic and atraumatic  Mouth/Throat: Oropharynx is clear and moist    Eyes: Pupils are equal, round, and reactive to light  Conjunctivae and EOM are normal    Neck: Normal range of motion  Neck supple  No thyromegaly present  Cardiovascular: Normal rate, regular rhythm and normal heart sounds  Pulmonary/Chest: Effort normal and breath sounds normal    Musculoskeletal: Normal range of motion  He exhibits no edema  ttp of right side of neck  Full rom  Motor strength 5/5    sensation appears intact at present   Lymphadenopathy:     He has no cervical adenopathy     Neurological: He is alert and oriented to person, place, and time    Psychiatric: He has a normal mood and affect  Nursing note and vitals reviewed  Pertinent Laboratory/Diagnostic Studies:  Lab Results   Component Value Date    GLUCOSE 106 (H) 02/27/2017    BUN 16 03/16/2018    CREATININE 0 60 (L) 02/27/2017    CALCIUM 9 8 03/16/2018     02/27/2017    K 4 4 03/16/2018    CO2 28 03/16/2018     03/16/2018     Lab Results   Component Value Date    ALT 19 02/27/2017    AST 19 02/27/2017    ALKPHOS 53 03/16/2018    BILITOT 0 4 02/27/2017       Lab Results   Component Value Date    WBC 3 7 (L) 05/11/2018    HGB 15 8 05/11/2018    HCT 45 3 05/11/2018    MCV 92 8 05/11/2018     05/11/2018       No results found for: TSH    Lab Results   Component Value Date    CHOL 184 02/27/2017     Lab Results   Component Value Date    TRIG 148 03/16/2018     Lab Results   Component Value Date    HDL 48 03/16/2018     Lab Results   Component Value Date    LDLCALC 98 02/27/2017     No results found for: HGBA1C    Results for orders placed or performed during the hospital encounter of 06/15/18   Tissue Exam   Result Value Ref Range    Case Report       Surgical Pathology Report                         Case: [id number]                                   Authorizing Provider:  Clau Mast MD           Collected:           06/15/2018 1241              Ordering Location:     [facility]        Received:            06/15/2018 [facility]                                                     Pathologist:           Jaycob Elmore MD                                                               Specimen:    Esophagus, proximal esophagus r/o eosinophil esopnagitis                                   Final Diagnosis       A   Proximal esophagus (biopsy):  - Esophageal mucosa with rare eosinophils (range 0-2/HPF)  - No intestinal metaplasia     Interpretation performed at [facility], [address] [place] [address] Additional Information       All controls performed with the immunohistochemical stains reported above reacted appropriately  These tests were developed and their performance characteristics determined by [facility] or [facility]  They may not be cleared or approved by the [place]  Food and Drug Administration  The FDA has determined that such clearance or approval is not necessary  These tests are used for clinical purposes  They should not be regarded as investigational or for research  This laboratory has been approved by [facility], designated as a high-complexity laboratory and is qualified to perform these tests  Gross Description       A  The specimen is received in formalin, labeled with the patient's name and medical record number, and is designated "proximal esophageal biopsy rule out eosinophilic esophagitis  The specimen consists of 3 colorless to pink, focally friable soft tissue fragments ranging from 0 2-0 4 cm in greatest dimension  The specimen is entirely submitted in 1 cassette  Note: The estimated total formalin fixation time based upon information provided by the submitting clinician and the standard processing schedule is under 72 hours       [facility]         Orders Placed This Encounter   Procedures    MRI cervical spine wo contrast    Comprehensive metabolic panel    Vitamin B12    TSH, 3rd generation with Free T4 reflex    Ambulatory referral to Pain Management       ALLERGIES:  No Known Allergies    Current Medications     Current Outpatient Prescriptions   Medication Sig Dispense Refill    omeprazole (PriLOSEC) 20 mg delayed release capsule TAKE 2 CAPSULES BY MOUTH TWICE A DAY 60 capsule 5    amoxicillin (AMOXIL) 500 mg capsule Take 1 capsule (500 mg total) by mouth every 12 (twelve) hours for 7 days 14 capsule 0    losartan (COZAAR) 100 MG tablet Take 1 tablet (100 mg total) by mouth daily 30 tablet 3     No current facility-administered medications for this visit            Health Maintenance     Health Maintenance   Topic Date Due    Depression Screening PHQ  03/19/2019    DTaP,Tdap,and Td Vaccines (8 - Td) 02/15/2027    INFLUENZA VACCINE  Completed     Immunization History   Administered Date(s) Administered    DT (pediatric) 07/19/1990    DTP 1975, 02/13/1976, 05/18/1976, 12/21/1976, 06/03/1981    Hep B, Adolescent or Pediatric 04/01/1993, 05/08/1993, 05/06/1994    Influenza 11/06/2018    MMR 09/29/1976, 06/22/1982    OPV 01/13/1976, 03/17/1976, 06/22/1976, 04/04/1977    Tdap 06/16/2006, 02/15/2017       Luis Gutiérrez MD

## 2018-12-20 PROBLEM — R20.2 NUMBNESS AND TINGLING: Status: ACTIVE | Noted: 2018-12-20

## 2018-12-20 PROBLEM — R20.0 NUMBNESS AND TINGLING: Status: ACTIVE | Noted: 2018-12-20

## 2018-12-20 PROBLEM — R23.2 FACIAL FLUSHING: Status: ACTIVE | Noted: 2018-12-20

## 2018-12-20 PROBLEM — J06.9 UPPER RESPIRATORY TRACT INFECTION: Status: ACTIVE | Noted: 2018-12-20

## 2018-12-20 PROBLEM — M54.2 NECK PAIN: Status: ACTIVE | Noted: 2018-12-20

## 2018-12-26 ENCOUNTER — TELEPHONE (OUTPATIENT)
Dept: FAMILY MEDICINE CLINIC | Facility: CLINIC | Age: 43
End: 2018-12-26

## 2018-12-26 NOTE — TELEPHONE ENCOUNTER
Pt's insurance denied coverage for prior Auth/tier exception-brand brand necessary for Benicar  Pt must have documentation or trial and failure of ALL formulary alternatives:  1  Candesartan  2  Eprosartan  3  Irbesartan  4  Losartan  5  Olmesartan  6  Telmisartan  7  Valsartan    Please advise instructions for pt's meds  Should he continue with same med or change?

## 2018-12-28 ENCOUNTER — HOSPITAL ENCOUNTER (OUTPATIENT)
Dept: MRI IMAGING | Facility: HOSPITAL | Age: 43
Discharge: HOME | End: 2018-12-28
Payer: COMMERCIAL

## 2018-12-28 DIAGNOSIS — R20.2 NUMBNESS AND TINGLING: ICD-10-CM

## 2018-12-28 DIAGNOSIS — R20.0 NUMBNESS AND TINGLING: ICD-10-CM

## 2018-12-28 DIAGNOSIS — M54.2 NECK PAIN: ICD-10-CM

## 2018-12-28 PROCEDURE — 72141 MRI NECK SPINE W/O DYE: CPT

## 2018-12-31 ENCOUNTER — TELEPHONE (OUTPATIENT)
Dept: FAMILY MEDICINE CLINIC | Facility: CLINIC | Age: 43
End: 2018-12-31

## 2018-12-31 NOTE — TELEPHONE ENCOUNTER
----- Message from Surjit Concepcion MD sent at 12/31/2018 12:15 PM EST -----  Can you please let patient know that his MRI cervical spine showed multilevel arthritis most notable at the level of C5-C6  There is narrowing noted on the right side at this level as well which is probably causing his symptoms  I would like patient to keep his scheduled appointment with pain management  If symptoms do worsen, I would like him to let me know or go to the emergency room  If he would like to get in sooner, he can call pain management as he has MRI results back now    Thank you

## 2018-12-31 NOTE — TELEPHONE ENCOUNTER
----- Message from Rishabh Kimball MD sent at 12/31/2018 12:15 PM EST -----  Can you please let patient know that his MRI cervical spine showed multilevel arthritis most notable at the level of C5-C6  There is narrowing noted on the right side at this level as well which is probably causing his symptoms  I would like patient to keep his scheduled appointment with pain management  If symptoms do worsen, I would like him to let me know or go to the emergency room  If he would like to get in sooner, he can call pain management as he has MRI results back now    Thank you

## 2018-12-31 NOTE — PROGRESS NOTES
Can you please let patient know that his MRI cervical spine showed multilevel arthritis most notable at the level of C5-C6  There is narrowing noted on the right side at this level as well which is probably causing his symptoms  I would like patient to keep his scheduled appointment with pain management  If symptoms do worsen, I would like him to let me know or go to the emergency room  If he would like to get in sooner, he can call pain management as he has MRI results back now    Thank you

## 2018-12-31 NOTE — TELEPHONE ENCOUNTER
Can you please inform patient  Can you ask the patient how his blood pressures have been on the higher dose of losartan?   Thank you

## 2019-01-02 DIAGNOSIS — I10 HYPERTENSION, UNSPECIFIED TYPE: Primary | ICD-10-CM

## 2019-01-02 RX ORDER — HYDROCHLOROTHIAZIDE 12.5 MG/1
12.5 TABLET ORAL DAILY
Qty: 30 TABLET | Refills: 5 | Status: SHIPPED | OUTPATIENT
Start: 2019-01-02 | End: 2019-01-14

## 2019-01-02 NOTE — TELEPHONE ENCOUNTER
Spoke with pt  He reports systolic numbers have come down to the 130's ([phone]), the diastolic reading is still in the 90's (93-96)     Pt has been taking new dose for a week and a half  Pt denies SOB, CP, dizziness, HA or any other Sx  He states he just feels stressed because B/P is not under control  I did explain to pt  Ins will not approve brand necessary  Benicar  w/o documentation of trial and failure of all the formulary alternatives

## 2019-01-02 NOTE — TELEPHONE ENCOUNTER
Because patient's blood pressure remains elevated, I would like to add hydrochlorothiazide, another medication  Can you ask patient if he is agreeable with this?   Thank you

## 2019-01-02 NOTE — TELEPHONE ENCOUNTER
Patient is aware of MD instructions  Patient is agreeable to starting the medication, please send to the [facility] pharmacy in [facility]

## 2019-01-07 ENCOUNTER — TELEPHONE (OUTPATIENT)
Dept: FAMILY MEDICINE CLINIC | Facility: CLINIC | Age: 44
End: 2019-01-07

## 2019-01-07 NOTE — TELEPHONE ENCOUNTER
Patient called about his blood pressure medication Losartan 100mg, this is not working for him along with the diaretic he is in the range of 130/140  with mid 90's  Can you please change his medication for him  [facility] [place]  Any questions please contact patient at [phone]

## 2019-01-08 NOTE — TELEPHONE ENCOUNTER
Pt aware but has questions about adding another med to the losartan and if they interact  Also asked if he tries this if it will go towards trying another med in order to go back to the benicar   Please advise

## 2019-01-08 NOTE — TELEPHONE ENCOUNTER
We can add a medication by the name of amlodipine to his current regimen if he is agreeable with this    Thank you

## 2019-01-14 DIAGNOSIS — I10 HYPERTENSION, UNSPECIFIED TYPE: Primary | ICD-10-CM

## 2019-01-14 RX ORDER — HYDROCHLOROTHIAZIDE 25 MG/1
25 TABLET ORAL DAILY
Qty: 30 TABLET | Refills: 3 | Status: SHIPPED | OUTPATIENT
Start: 2019-01-14 | End: 2019-05-07 | Stop reason: SDUPTHER

## 2019-01-14 RX ORDER — AMLODIPINE BESYLATE 5 MG/1
5 TABLET ORAL DAILY
Qty: 30 TABLET | Refills: 3 | Status: SHIPPED | OUTPATIENT
Start: 2019-01-14 | End: 2019-02-08

## 2019-01-14 NOTE — TELEPHONE ENCOUNTER
Spoke to patient regarding blood pressure  Patient states he is still elevated with his home BP readings, 130/90-95  I will go ahead and discontinue losartan is patient does not feel it is helping  I will increase hydrochlorothiazide to 25 mg from 12 5 mg and add amlodipine 5 mg daily  I would like patient to have blood work done in approximately 1 week to check potassium level  May also benefit from having a banana a day  He will keep an eye on the blood pressure and let me know what his readings are  I would like him to return in a couple weeks for BP check  He is agreeable with this plan  Patient also has been experiencing neck pain for which she will see Pain Management tomorrow  I feel this may also be contributing to elevated blood pressures

## 2019-01-15 ENCOUNTER — APPOINTMENT (OUTPATIENT)
Dept: RADIOLOGY | Facility: CLINIC | Age: 44
End: 2019-01-15
Payer: COMMERCIAL

## 2019-01-15 ENCOUNTER — OFFICE VISIT (OUTPATIENT)
Dept: PAIN MEDICINE | Facility: CLINIC | Age: 44
End: 2019-01-15
Payer: COMMERCIAL

## 2019-01-15 VITALS
DIASTOLIC BLOOD PRESSURE: 82 MMHG | SYSTOLIC BLOOD PRESSURE: 122 MMHG | BODY MASS INDEX: 29.42 KG/M2 | HEART RATE: 78 BPM | WEIGHT: 222 LBS | HEIGHT: 73 IN

## 2019-01-15 DIAGNOSIS — M48.02 CERVICAL SPINAL STENOSIS: ICD-10-CM

## 2019-01-15 DIAGNOSIS — M47.816 LUMBAR SPONDYLOSIS: Primary | ICD-10-CM

## 2019-01-15 DIAGNOSIS — M54.12 CERVICAL RADICULOPATHY: ICD-10-CM

## 2019-01-15 DIAGNOSIS — G89.29 CHRONIC BILATERAL LOW BACK PAIN WITHOUT SCIATICA: ICD-10-CM

## 2019-01-15 DIAGNOSIS — R20.2 NUMBNESS AND TINGLING: ICD-10-CM

## 2019-01-15 DIAGNOSIS — M54.2 NECK PAIN: ICD-10-CM

## 2019-01-15 DIAGNOSIS — M54.50 CHRONIC BILATERAL LOW BACK PAIN WITHOUT SCIATICA: ICD-10-CM

## 2019-01-15 DIAGNOSIS — R20.0 NUMBNESS AND TINGLING: ICD-10-CM

## 2019-01-15 PROBLEM — J30.81 ALLERGY TO CATS: Status: ACTIVE | Noted: 2017-02-15

## 2019-01-15 PROBLEM — R73.01 IMPAIRED FASTING GLUCOSE: Status: ACTIVE | Noted: 2017-02-28

## 2019-01-15 PROCEDURE — 72110 X-RAY EXAM L-2 SPINE 4/>VWS: CPT

## 2019-01-15 PROCEDURE — 99244 OFF/OP CNSLTJ NEW/EST MOD 40: CPT | Performed by: ANESTHESIOLOGY

## 2019-01-15 NOTE — PROGRESS NOTES
Assessment:  1  Lumbar spondylosis    2  Neck pain    3  Numbness and tingling    4  Cervical spinal stenosis    5  Cervical radiculopathy    6  Chronic bilateral low back pain without sciatica        Plan:    The patient's pain persists despite time, relative rest, activity modification and therapy  I believe that he would benefit from cervical epidural steroid injection to directly diminish any inflammatory component of his pain  I will initially use an translaminar approach  If the patient does not receive significant pain relief following the initial injection, I may need to repeat using a transforaminal approach that may allow for better concentrate of the steroid along the affected nerve root  In regards to his low back pain, based on the patient's symptoms and examination, I suspect that his pain is being generated by the lumbar facet joints  The facet joints are only one of many possible low back pain generators  Unfortunately, studies have demonstrated that history and examination alone are unreliable  We will schedule the patient for diagnostic lumbar medial branch blockade using a double block paradigm  If the patient receives significant pain relief of appropriate duration with bupivicaine 0 25%, we will confirm with bupivicaine 0 75%  If the patient demonstrates appropriate response to medial branch blockade we will schedule for radiofrequency ablation of the blocked nerves to provide long-term pain relief  In the office today, we reviewed the nature of the patient's pathology in depth using  diagrams and models  We discussed the approach we would use for the medial branch block and cervical epidural steroid injection and provided literature for home review    The patient understands the risks associated with the procedure including bleeding, infection, tissue injury, exacerbation of symptoms, spinal headache allergic reaction and paralysis and provided written and verbal consent  in the office today  My impressions and treatment recommendations were discussed in detail with the patient who verbalized understanding and had no further questions  Discharge instructions were provided  I personally saw and examined the patient and I agree with the above discussed plan of care  Orders Placed This Encounter   Procedures    FL spine and pain procedure     Standing Status:   Future     Standing Expiration Date:   1/15/2023     Order Specific Question:   Reason for Exam:     Answer:   Cervical epidural steroid injection     Order Specific Question:   Anticoagulant hold needed? Answer:   No    X-ray lumbar spine complete 4+ views     Standing Status:   Future     Standing Expiration Date:   1/15/2023     Scheduling Instructions:      Bring along any outside films relating to this procedure  Order Specific Question:   Reason for Exam:     Answer:   Low back pain    FL spine and pain procedure     Standing Status:   Future     Standing Expiration Date:   1/15/2023     Order Specific Question:   Reason for Exam:     Answer:   Bilateral L3, L4, L5 dorsal ramus medial branch block 1  Order Specific Question:   Anticoagulant hold needed? Answer:   no     No orders of the defined types were placed in this encounter  History of Present Illness:    Joseph Marion is a 37 y o  male with a longstanding history of neck pain with numbness and tingling in both his arms and hands  In the past he was treated with cervical transforaminal epidural steroid injections provided significant relief for years  He also complains of chronic low back pain he has had epidural steroid injections without relief    His pain is moderate rates it as 2/10 on the visual analog scale but can't increased at 7/10 on the visual analog scale it is significant interfering with daily living activities describes constant worse in the evening he has sharp numbing sensation in his shoulders and hands and pressure-like and achy sensation in his low back  He notes that standing and walking increases symptoms bending increases his low back pain while relaxation decreases it  He has undergone chiropractic treatment and had nerve blocks  I have personally reviewed and/or updated the patient's past medical history, past surgical history, family history, social history, current medications, allergies, and vital signs today  Review of Systems:    Review of Systems   Constitutional: Negative for fever and unexpected weight change  HENT: Negative for trouble swallowing  Eyes: Negative for visual disturbance  Respiratory: Negative for shortness of breath and wheezing  Cardiovascular: Negative for chest pain and palpitations  Gastrointestinal: Negative for constipation, diarrhea, nausea and vomiting  Endocrine: Negative for cold intolerance, heat intolerance and polydipsia  Genitourinary: Negative for difficulty urinating and frequency  Musculoskeletal: Negative for arthralgias, gait problem, joint swelling and myalgias  Skin: Negative for rash  Neurological: Negative for dizziness, seizures, syncope, weakness and headaches  Hematological: Does not bruise/bleed easily  Psychiatric/Behavioral: Negative for dysphoric mood  All other systems reviewed and are negative        Patient Active Problem List   Diagnosis    Routine health maintenance    Hypertension    Generalized anxiety disorder    Food sticks on swallowing    Fatigue    SOBOE (shortness of breath on exertion)    Low back pain without sciatica    Anxiety    Neck pain    Numbness and tingling    Upper respiratory tract infection    Facial flushing    Allergy to cats    History of positive PPD    Impaired fasting glucose       Past Medical History:   Diagnosis Date    Cervical disc disorder     c6,c7, epidural injections for pain management    Fatigue     Generalized anxiety disorder     pt denies    Hypertension     SOBOE (shortness of breath on exertion)     pt denies       Past Surgical History:   Procedure Laterality Date    ESOPHAGOGASTRODUODENOSCOPY      AL ESOPHAGOGASTRODUODENOSCOPY TRANSORAL DIAGNOSTIC N/A 6/15/2018    Procedure: ESOPHAGOGASTRODUODENOSCOPY (EGD); Surgeon: Mark Zabala MD;  Location: [facility]; Service: Gastroenterology       Family History   Problem Relation Age of Onset    Hypertension Mother     Gout Father        Social History     Occupational History    Not on file  Social History Main Topics    Smoking status: Never Smoker    Smokeless tobacco: Never Used    Alcohol use Yes      Comment: Social    Drug use: No    Sexual activity: Not on file       Current Outpatient Prescriptions on File Prior to Visit   Medication Sig    amLODIPine (NORVASC) 5 mg tablet Take 1 tablet (5 mg total) by mouth daily    hydrochlorothiazide (HYDRODIURIL) 25 mg tablet Take 1 tablet (25 mg total) by mouth daily    omeprazole (PriLOSEC) 20 mg delayed release capsule TAKE 2 CAPSULES BY MOUTH TWICE A DAY     No current facility-administered medications on file prior to visit  No Known Allergies    Physical Exam:    /82   Pulse 78   Ht 6' 1" (1 854 m)   Wt 101 kg (222 lb)   BMI 29 29 kg/m²     Constitutional: normal, well developed, well nourished, alert, in no distress and non-toxic and no overt pain behavior  Eyes: anicteric  HEENT: grossly intact  Neck: supple, symmetric, trachea midline and no masses   Pulmonary:even and unlabored  Cardiovascular:No edema or pitting edema present  Skin:Normal without rashes or lesions and well hydrated  Psychiatric:Mood and affect appropriate  Neurologic:Cranial Nerves II-XII grossly intact  Musculoskeletal:normal,   Inspection:  Normal station and gait  Normal lumbar/cervical lordotic curve with no significant scoliosis or spinal step-off  Skin intact without erythema  No gross mass or muscle atrophy       Palpation:  There is no tenderness to palpation overlying the sacroiliac joint as well as the ischial bursa bilateral   No significant tenderness over the greater trochanteric bursa bilaterally  There is tenderness along the bilateral lumbar paraspinals no tenderness on the cervical paraspinals no shoulder tenderness  Motor/Strength:  5/5 strength in the bilateral upper and lower limbs  The patient is able to heel and/toe walk  Tandem gait is intact  Reflexes: Deep tendon reflex are 2+ and symmetrical bilateral upper lower limbsSensation:   Sensation intact to light touch and pinprick in the bilateral lower limbs  Proprioception is intact at bilateral hallux  Maneuvers: Negative bilateral straight leg raising  Negative Bk's maneuver  Positive pain with lumbar extension  Positive Spurling maneuver bilaterally            Imaging    MRI CERVICAL SPINE WITHOUT CONTRAST 12/28/18     INDICATION: Cervicalgia with numbness and tingling in the shoulders, arms and fingers      COMPARISON:  None      TECHNIQUE:  Sagittal T1, sagittal T2, sagittal inversion recovery, axial T2, axial  2D merge     IMAGE QUALITY:  Motion degradation on axial imaging      FINDINGS:     ALIGNMENT:  Mild straightening and reversal the cervical lordosis  Mild dextroscoliotic curvature which may be positional      MARROW SIGNAL:  Normal marrow signal is identified within the visualized bony structures  No discrete marrow lesion      CERVICAL AND VISUALIZED THORACIC CORD:  Normal signal within the visualized cord      PREVERTEBRAL AND PARASPINAL SOFT TISSUES:  Normal      VISUALIZED POSTERIOR FOSSA:  The visualized posterior fossa demonstrates no abnormal signal      CERVICAL DISC SPACES:  Disc space narrowing at C4-C5, C5-C6 and C6-C7      C2-C3:  Suggestion of mild right uncovertebral hypertrophy  Questionable congenital narrowing of the bilateral neural foramina    No spinal stenosis      C3-C4:  Uncovertebral hypertrophy, questionable left foraminal disc osteophyte complex with moderate left foraminal stenosis  No significant spinal canal or right-sided foraminal stenosis      C4-C5:  Central annular fissure and protrusion with mild ventral indentation on the thecal sac  The spinal canal remains patent  There is suggestion of mild left facet arthropathy  The neural foramina remain patent      C5-C6:  Posterior disc osteophyte complex, eccentric to the right  Right central and proximal foraminal disc osteophyte complex abuts the right ventral cord surface and mildly indented without definitive cord signal abnormality  There is severe right   foraminal stenosis  The left neural foramina is moderately narrowed        C6-C7:  Posterior disc osteophyte complex with uncovertebral hypertrophy and a right foraminal disc osteophyte complex resulting in severe right foraminal stenosis  There is also moderate left foraminal stenosis  Ventral indentation on the thecal sac   and cord is noted without definitive cord signal abnormality  There is mild spinal stenosis  C7-T1:  Left central to foraminal disc osteophyte complex with mild ventral indentation on the left thecal sac margin  No cord indentation or signal abnormality  Mild left foraminal encroachment  No significant spinal canal or right-sided foraminal   stenosis         UPPER THORACIC DISC SPACES:  Normal      IMPRESSION:     Multilevel cervical spondylosis with mild reversal the cervical lordosis  Degenerative discogenic disease is most notable at C5-C6 where there is a central right proximal foraminal disc osteophyte complex indenting the right ventral cord surface without   cord signal abnormality  Severe right and moderate left foraminal stenosis is noted at C5-C6             I have personally reviewed pertinent films in PACS

## 2019-01-21 ENCOUNTER — HOSPITAL ENCOUNTER (OUTPATIENT)
Dept: RADIOLOGY | Facility: CLINIC | Age: 44
Discharge: HOME | End: 2019-01-21
Attending: ANESTHESIOLOGY
Payer: COMMERCIAL

## 2019-01-21 VITALS
RESPIRATION RATE: 16 BRPM | OXYGEN SATURATION: 98 % | DIASTOLIC BLOOD PRESSURE: 89 MMHG | SYSTOLIC BLOOD PRESSURE: 147 MMHG | HEART RATE: 69 BPM | TEMPERATURE: 98.7 F

## 2019-01-21 DIAGNOSIS — M48.02 CERVICAL SPINAL STENOSIS: ICD-10-CM

## 2019-01-21 DIAGNOSIS — M54.12 CERVICAL RADICULOPATHY: ICD-10-CM

## 2019-01-21 PROCEDURE — 62321 NJX INTERLAMINAR CRV/THRC: CPT | Performed by: ANESTHESIOLOGY

## 2019-01-21 RX ORDER — LIDOCAINE HYDROCHLORIDE 10 MG/ML
5 INJECTION, SOLUTION EPIDURAL; INFILTRATION; INTRACAUDAL; PERINEURAL ONCE
Status: COMPLETED | OUTPATIENT
Start: 2019-01-21 | End: 2019-01-21

## 2019-01-21 RX ORDER — METHYLPREDNISOLONE ACETATE 80 MG/ML
160 INJECTION, SUSPENSION INTRA-ARTICULAR; INTRALESIONAL; INTRAMUSCULAR; PARENTERAL; SOFT TISSUE ONCE
Status: COMPLETED | OUTPATIENT
Start: 2019-01-21 | End: 2019-01-21

## 2019-01-21 RX ADMIN — METHYLPREDNISOLONE ACETATE 160 MG: 80 INJECTION, SUSPENSION INTRA-ARTICULAR; INTRALESIONAL; INTRAMUSCULAR; SOFT TISSUE at 10:34

## 2019-01-21 RX ADMIN — IOHEXOL 1 ML: 300 INJECTION, SOLUTION INTRAVENOUS at 10:33

## 2019-01-21 RX ADMIN — LIDOCAINE HYDROCHLORIDE 5 ML: 10 INJECTION, SOLUTION EPIDURAL; INFILTRATION; INTRACAUDAL; PERINEURAL at 10:30

## 2019-01-21 NOTE — H&P
History of Present Illness: The patient is a 37 y o  male who presents with complaints of neck and arm pain  Patient Active Problem List   Diagnosis    Routine health maintenance    Hypertension    Generalized anxiety disorder    Food sticks on swallowing    Fatigue    SOBOE (shortness of breath on exertion)    Low back pain without sciatica    Anxiety    Neck pain    Numbness and tingling    Upper respiratory tract infection    Facial flushing    Allergy to cats    History of positive PPD    Impaired fasting glucose       Past Medical History:   Diagnosis Date    Cervical disc disorder     c6,c7, epidural injections for pain management    Fatigue     Generalized anxiety disorder     pt denies    Hypertension     SOBOE (shortness of breath on exertion)     pt denies       Past Surgical History:   Procedure Laterality Date    ESOPHAGOGASTRODUODENOSCOPY      AR ESOPHAGOGASTRODUODENOSCOPY TRANSORAL DIAGNOSTIC N/A 6/15/2018    Procedure: ESOPHAGOGASTRODUODENOSCOPY (EGD); Surgeon: Nella Askew MD;  Location: [facility]; Service: Gastroenterology         Current Outpatient Prescriptions:     amLODIPine (NORVASC) 5 mg tablet, Take 1 tablet (5 mg total) by mouth daily, Disp: 30 tablet, Rfl: 3    hydrochlorothiazide (HYDRODIURIL) 25 mg tablet, Take 1 tablet (25 mg total) by mouth daily, Disp: 30 tablet, Rfl: 3    omeprazole (PriLOSEC) 20 mg delayed release capsule, TAKE 2 CAPSULES BY MOUTH TWICE A DAY, Disp: 60 capsule, Rfl: 5    Current Facility-Administered Medications:     iohexol (OMNIPAQUE) 300 mg/mL injection 50 mL, 50 mL, Epidural, Once, Andrés Dailey DO    lidocaine (PF) (XYLOCAINE-MPF) 1 % injection 5 mL, 5 mL, Other, Once, Andrés Dailey DO    methylPREDNISolone acetate (DEPO-MEDROL) injection 160 mg, 160 mg, Epidural, Once, Andrés Dailey DO    No Known Allergies    Physical Exam: There were no vitals filed for this visit    General: Awake, Alert, Oriented x 3, Mood and affect appropriate  Respiratory: Respirations even and unlabored  Cardiovascular: Peripheral pulses intact; no edema  Musculoskeletal Exam:  Decreased range of motion cervical spine    ASA Score: II         Assessment:   1  Cervical spinal stenosis    2   Cervical radiculopathy        Plan: Cervical epidural steroid injection

## 2019-01-21 NOTE — DISCHARGE INSTRUCTIONS
Epidural Steroid Injection   WHAT YOU NEED TO KNOW:   An epidural steroid injection (VINICIUS) is a procedure to inject steroid medicine into the epidural space  The epidural space is between your spinal cord and vertebrae  Steroids reduce inflammation and fluid buildup in your spine that may be causing pain  You may be given pain medicine along with the steroids  ACTIVITY  · Do not drive or operate machinery today  · No strenuous activity today - bending, lifting, etc   · You may resume normal activites starting tomorrow - start slowly and as tolerated  · You may shower today, but no tub baths or hot tubs  · You may have numbness for several hours from the local anesthetic  Please use caution and common sense, especially with weight-bearing activities  CARE OF THE INJECTION SITE  · If you have soreness or pain, apply ice to the area today (20 minutes on/20 minutes off)  · Starting tomorrow, you may use warm, moist heat or ice if needed  · You may have an increase or change in your discomfort for 36-48 hours after your treatment  · Apply ice and continue with any pain medication you have been prescribed  · Notify [facility] if you have any of the following: redness, drainage, swelling, headache, stiff neck or fever above 100°F     SPECIAL INSTRUCTIONS  · Our office will contact you in approximately 7 days for a progress report  MEDICATIONS  · Continue to take all routine medications  · Our office may have instructed you to hold some medications  If you have a problem specifically related to your procedure, please call our office at [phone]  Problems not related to your procedure should be directed to your primary care physician

## 2019-01-28 ENCOUNTER — TELEPHONE (OUTPATIENT)
Dept: PAIN MEDICINE | Facility: CLINIC | Age: 44
End: 2019-01-28

## 2019-01-28 NOTE — TELEPHONE ENCOUNTER
Pt reports 70% improvement post inj   No pain just some stiffness still   Numbness has dissipated     S/p ALEXANDRO 1/21, 2/4 MBB

## 2019-02-04 ENCOUNTER — HOSPITAL ENCOUNTER (OUTPATIENT)
Dept: RADIOLOGY | Facility: CLINIC | Age: 44
Discharge: HOME | End: 2019-02-04
Attending: ANESTHESIOLOGY
Payer: COMMERCIAL

## 2019-02-04 VITALS
TEMPERATURE: 97.8 F | DIASTOLIC BLOOD PRESSURE: 92 MMHG | HEART RATE: 73 BPM | SYSTOLIC BLOOD PRESSURE: 149 MMHG | OXYGEN SATURATION: 97 % | RESPIRATION RATE: 18 BRPM

## 2019-02-04 DIAGNOSIS — M47.816 LUMBAR SPONDYLOSIS: ICD-10-CM

## 2019-02-04 DIAGNOSIS — G89.29 CHRONIC BILATERAL LOW BACK PAIN WITHOUT SCIATICA: ICD-10-CM

## 2019-02-04 DIAGNOSIS — M54.50 CHRONIC BILATERAL LOW BACK PAIN WITHOUT SCIATICA: ICD-10-CM

## 2019-02-04 PROCEDURE — 64493 INJ PARAVERT F JNT L/S 1 LEV: CPT | Performed by: ANESTHESIOLOGY

## 2019-02-04 PROCEDURE — 64494 INJ PARAVERT F JNT L/S 2 LEV: CPT | Performed by: ANESTHESIOLOGY

## 2019-02-04 RX ORDER — BUPIVACAINE HCL/PF 2.5 MG/ML
10 VIAL (ML) INJECTION ONCE
Status: COMPLETED | OUTPATIENT
Start: 2019-02-04 | End: 2019-02-04

## 2019-02-04 RX ADMIN — BUPIVACAINE HYDROCHLORIDE 6 ML: 2.5 INJECTION, SOLUTION EPIDURAL; INFILTRATION; INTRACAUDAL at 10:52

## 2019-02-04 NOTE — DISCHARGE INSTR - LAB

## 2019-02-04 NOTE — H&P
History of Present Illness: The patient is a 37 y o  male who presents with complaints of low back pain  Patient Active Problem List   Diagnosis    Routine health maintenance    Hypertension    Generalized anxiety disorder    Food sticks on swallowing    Fatigue    SOBOE (shortness of breath on exertion)    Low back pain without sciatica    Anxiety    Neck pain    Numbness and tingling    Upper respiratory tract infection    Facial flushing    Allergy to cats    History of positive PPD    Impaired fasting glucose       Past Medical History:   Diagnosis Date    Cervical disc disorder     c6,c7, epidural injections for pain management    Fatigue     Generalized anxiety disorder     pt denies    Hypertension     SOBOE (shortness of breath on exertion)     pt denies       Past Surgical History:   Procedure Laterality Date    ESOPHAGOGASTRODUODENOSCOPY      [place] ESOPHAGOGASTRODUODENOSCOPY TRANSORAL DIAGNOSTIC N/A 6/15/2018    Procedure: ESOPHAGOGASTRODUODENOSCOPY (EGD); Surgeon: Sharon Peterson MD;  Location: [facility]; Service: Gastroenterology         Current Outpatient Prescriptions:     amLODIPine (NORVASC) 5 mg tablet, Take 1 tablet (5 mg total) by mouth daily, Disp: 30 tablet, Rfl: 3    hydrochlorothiazide (HYDRODIURIL) 25 mg tablet, Take 1 tablet (25 mg total) by mouth daily, Disp: 30 tablet, Rfl: 3    omeprazole (PriLOSEC) 20 mg delayed release capsule, TAKE 2 CAPSULES BY MOUTH TWICE A DAY, Disp: 60 capsule, Rfl: 5    Current Facility-Administered Medications:     bupivacaine (PF) (MARCAINE) 0 25 % injection 10 mL, 10 mL, Perineural, Once, Andrés Dailey, DO    No Known Allergies    Physical Exam:   General: Awake, Alert, Oriented x 3, Mood and affect appropriate  Respiratory: Respirations even and unlabored  Cardiovascular: Peripheral pulses intact; no edema  Musculoskeletal Exam:  Decreased range of motion lumbar spine    ASA Score: II         Assessment:   1   Lumbar spondylosis 2  Chronic bilateral low back pain without sciatica        Plan: Bilateral L3, L4, L5 dorsal ramus medial branch block 1

## 2019-02-08 ENCOUNTER — TELEPHONE (OUTPATIENT)
Dept: FAMILY MEDICINE CLINIC | Facility: CLINIC | Age: 44
End: 2019-02-08

## 2019-02-08 DIAGNOSIS — I10 HYPERTENSION, UNSPECIFIED TYPE: Primary | ICD-10-CM

## 2019-02-08 RX ORDER — AMLODIPINE BESYLATE 10 MG/1
10 TABLET ORAL DAILY
Qty: 30 TABLET | Refills: 5 | Status: SHIPPED | OUTPATIENT
Start: 2019-02-08 | End: 2019-02-23 | Stop reason: ALTCHOICE

## 2019-02-08 NOTE — TELEPHONE ENCOUNTER
Spoke w/ pt and he will increase Amlodipine to 10 mg  Can you please send in for 10 mg tablets to [facility]? Pt would like to know how you want to proceed after taking the 10 mg ? Please advise   Thanks

## 2019-02-08 NOTE — TELEPHONE ENCOUNTER
I would like to increase his amlodipine to 10 mg daily from 5 mg daily and have him continue monitoring his blood pressure  I also would like him to see cardiology  Can you please provide patient with number for [facility]    Thank you

## 2019-02-08 NOTE — TELEPHONE ENCOUNTER
I would like him to keep an eye on his blood pressure for the next 1 week and follow up with me after this

## 2019-02-08 NOTE — TELEPHONE ENCOUNTER
I would like to have him increase amlodipine to 10 mg as this is how we typically manage blood pressure  I can call in a new prescription for 10 mg or he can double the 5 mg tabs  Can you ask him if he would like me to call in a new prescription?   Thank you

## 2019-02-08 NOTE — TELEPHONE ENCOUNTER
Patient called and stated that he has been on the amlodipine and the hydrochlorothiazide for about 3 weeks and he is still having the flushing in face and BP is around 146/90s  What would you like him to do at this time?   Please call to advise

## 2019-02-08 NOTE — TELEPHONE ENCOUNTER
Spoke w/ pt and gave MD instructions re: Amolodipine  Pt states that he does not feel he needs to see [facility]  He saw them in April 2018  Pt states that he took Benicar for years and it worked for him but insurance would not cover it   Pt states that he is doing the trial and error for medications in hopes that he can go back to Benicar  Pt is willing to try a third medication  Pt does not feel he needs to increase the Amlodipine  Please advise

## 2019-02-11 ENCOUNTER — HOSPITAL ENCOUNTER (OUTPATIENT)
Dept: RADIOLOGY | Facility: CLINIC | Age: 44
Discharge: HOME | End: 2019-02-11
Admitting: ANESTHESIOLOGY
Payer: COMMERCIAL

## 2019-02-11 VITALS
SYSTOLIC BLOOD PRESSURE: 130 MMHG | TEMPERATURE: 98.7 F | OXYGEN SATURATION: 96 % | HEART RATE: 66 BPM | RESPIRATION RATE: 18 BRPM | DIASTOLIC BLOOD PRESSURE: 90 MMHG

## 2019-02-11 DIAGNOSIS — M54.50 LOW BACK PAIN: ICD-10-CM

## 2019-02-11 DIAGNOSIS — M47.816 LUMBAR SPONDYLOSIS: ICD-10-CM

## 2019-02-11 PROCEDURE — 64494 INJ PARAVERT F JNT L/S 2 LEV: CPT | Performed by: ANESTHESIOLOGY

## 2019-02-11 PROCEDURE — 64493 INJ PARAVERT F JNT L/S 1 LEV: CPT | Performed by: ANESTHESIOLOGY

## 2019-02-11 RX ORDER — BUPIVACAINE HYDROCHLORIDE 7.5 MG/ML
10 INJECTION, SOLUTION EPIDURAL; RETROBULBAR ONCE
Status: COMPLETED | OUTPATIENT
Start: 2019-02-11 | End: 2019-02-11

## 2019-02-11 RX ADMIN — BUPIVACAINE HYDROCHLORIDE 6 ML: 7.5 INJECTION, SOLUTION EPIDURAL; RETROBULBAR at 10:57

## 2019-02-11 NOTE — DISCHARGE INSTRUCTIONS

## 2019-02-11 NOTE — H&P
History of Present Illness: The patient is a 37 y o  male who presents with complaints of bilateral low back pain  Patient Active Problem List   Diagnosis    Routine health maintenance    Hypertension    Generalized anxiety disorder    Food sticks on swallowing    Fatigue    SOBOE (shortness of breath on exertion)    Low back pain without sciatica    Anxiety    Neck pain    Numbness and tingling    Upper respiratory tract infection    Facial flushing    Allergy to cats    History of positive PPD    Impaired fasting glucose       Past Medical History:   Diagnosis Date    Cervical disc disorder     c6,c7, epidural injections for pain management    Fatigue     Generalized anxiety disorder     pt denies    Hypertension     SOBOE (shortness of breath on exertion)     pt denies       Past Surgical History:   Procedure Laterality Date    ESOPHAGOGASTRODUODENOSCOPY      [place] ESOPHAGOGASTRODUODENOSCOPY TRANSORAL DIAGNOSTIC N/A 6/15/2018    Procedure: ESOPHAGOGASTRODUODENOSCOPY (EGD); Surgeon: Sharon Peterson MD;  Location: [facility];   Service: Gastroenterology         Current Outpatient Medications:     amLODIPine (NORVASC) 10 mg tablet, Take 1 tablet (10 mg total) by mouth daily, Disp: 30 tablet, Rfl: 5    hydrochlorothiazide (HYDRODIURIL) 25 mg tablet, Take 1 tablet (25 mg total) by mouth daily, Disp: 30 tablet, Rfl: 3    omeprazole (PriLOSEC) 20 mg delayed release capsule, TAKE 2 CAPSULES BY MOUTH TWICE A DAY, Disp: 60 capsule, Rfl: 5    Current Facility-Administered Medications:     bupivacaine (PF) (MARCAINE) 0 75 % injection 10 mL, 10 mL, Other, Once, Andrés Dailey, DO    No Known Allergies    Physical Exam:   Vitals:    02/11/19 1050   BP: 143/93   Pulse:    Resp:    Temp:    SpO2:      General: Awake, Alert, Oriented x 3, Mood and affect appropriate  Respiratory: Respirations even and unlabored  Cardiovascular: Peripheral pulses intact; no edema  Musculoskeletal Exam:  Pain with lumbar extension    ASA Score: II    Patient/Chart Verification  Patient ID Verified: Verbal  ID Band Applied: No  Consents Confirmed: Procedural  H&P( within 30 days) Verified: To be obtained in the Pre-Procedure area  Interval H&P(within 24 hr) Complete (required for Outpatients and Surgery Admit only): To be obtained in the Pre-Procedure area  Allergies Reviewed: Yes  Anticoag/NSAID held?: No  Currently on antibiotics?: No  Pre-op Lab/Test Results Available: N/A    Assessment:   1  Lumbar spondylosis    2   Low back pain        Plan: B/L L3,L4,L5 DORSAL RAMUS MBB #2

## 2019-02-21 ENCOUNTER — TELEPHONE (OUTPATIENT)
Dept: FAMILY MEDICINE CLINIC | Facility: CLINIC | Age: 44
End: 2019-02-21

## 2019-02-21 NOTE — TELEPHONE ENCOUNTER
He will need to come in for BP check before I can change any medications as discussed  He can come in and schedule appt with Kamala    Thank you

## 2019-02-21 NOTE — TELEPHONE ENCOUNTER
Patient wants to know if Dr Esther Bajwa can change his BP medication  The Amlodipine is not working  Please call to advise

## 2019-02-21 NOTE — TELEPHONE ENCOUNTER
FYI: Dr Jarrod Bansal will be seeing pt for this Saturday:    Spoke with pt, he is unable to come until Saturday due to his wife being out of town  Scheduled to see Dr Beti Menendez 5/60/17 @ 8am  Needs 3rd drug to work or trial and fail, was on Benicar but insurance changed and it will not cover without failure

## 2019-02-23 ENCOUNTER — OFFICE VISIT (OUTPATIENT)
Dept: FAMILY MEDICINE CLINIC | Facility: CLINIC | Age: 44
End: 2019-02-23
Payer: COMMERCIAL

## 2019-02-23 VITALS
BODY MASS INDEX: 29 KG/M2 | HEART RATE: 68 BPM | SYSTOLIC BLOOD PRESSURE: 130 MMHG | WEIGHT: 218.8 LBS | HEIGHT: 73 IN | DIASTOLIC BLOOD PRESSURE: 92 MMHG | TEMPERATURE: 97.2 F | RESPIRATION RATE: 18 BRPM

## 2019-02-23 DIAGNOSIS — I10 HYPERTENSION, UNSPECIFIED TYPE: Primary | ICD-10-CM

## 2019-02-23 PROCEDURE — 99213 OFFICE O/P EST LOW 20 MIN: CPT | Performed by: FAMILY MEDICINE

## 2019-02-23 PROCEDURE — 1036F TOBACCO NON-USER: CPT | Performed by: FAMILY MEDICINE

## 2019-02-23 PROCEDURE — 3008F BODY MASS INDEX DOCD: CPT | Performed by: FAMILY MEDICINE

## 2019-02-23 RX ORDER — OLMESARTAN MEDOXOMIL 20 MG/1
20 TABLET ORAL DAILY
Qty: 30 TABLET | Refills: 3 | Status: SHIPPED | OUTPATIENT
Start: 2019-02-23 | End: 2019-05-18 | Stop reason: SDUPTHER

## 2019-02-23 NOTE — PROGRESS NOTES
FAMILY PRACTICE OFFICE VISIT       NAME: Reji Foster  AGE: 37 y o  SEX: male       : 1975        MRN: [id number]    DATE: 2019  TIME: 8:21 AM    Assessment and Plan     Problem List Items Addressed This Visit        Cardiovascular and Mediastinum    Hypertension - Primary     Hypertension  Patient blood pressure is still above goal   He was willing to try Olmesartan 20 mg and continue with his hydrochlorothiazide 25 mg once a day  He will monitor blood pressures and will call the office if blood pressure is maintained above 140/90 or should he develop any side effects  We will titrate medication accordingly         Relevant Medications    olmesartan (BENICAR) 20 mg tablet            There are no Patient Instructions on file for this visit  Chief Complaint     Chief Complaint   Patient presents with    Follow-up     Patient is here for a bp recheck  History of Present Illness     Patient states initially he had been on brand-name Benicar for blood pressure  Due to insurance coverage patient had try generic Benicar few years ago but found that it was ineffective  He has since been on hydrochlorothiazide, losartan, and amlodipine which have either been ineffective or patient develops side effects of a facial flushing  Home blood pressures still remain at times above 140/90  He denies any chest pain or shortness of breath  Patient did stop his amlodipine approximately 2 weeks ago      Review of Systems   Review of Systems   Constitutional: Negative  Respiratory: Negative  Cardiovascular: Negative  Skin: Negative  Neurological: Negative          Active Problem List     Patient Active Problem List   Diagnosis    Routine health maintenance    Hypertension    Generalized anxiety disorder    Food sticks on swallowing    Fatigue    SOBOE (shortness of breath on exertion)    Low back pain without sciatica    Anxiety    Neck pain    Numbness and tingling    Upper respiratory tract infection    Facial flushing    Allergy to cats    History of positive PPD    Impaired fasting glucose       Past Medical History:  Past Medical History:   Diagnosis Date    Cervical disc disorder     c6,c7, epidural injections for pain management    Fatigue     Generalized anxiety disorder     pt denies    Hypertension     SOBOE (shortness of breath on exertion)     pt denies       Past Surgical History:  Past Surgical History:   Procedure Laterality Date    ESOPHAGOGASTRODUODENOSCOPY      AR ESOPHAGOGASTRODUODENOSCOPY TRANSORAL DIAGNOSTIC N/A 6/15/2018    Procedure: ESOPHAGOGASTRODUODENOSCOPY (EGD); Surgeon: Samia Waggoner MD;  Location: [facility];   Service: Gastroenterology       Family History:  Family History   Problem Relation Age of Onset    Hypertension Mother    Garzon Gout Father        Social History:  Social History     Socioeconomic History    Marital status: /Civil Union     Spouse name: Not on file    Number of children: Not on file    Years of education: Not on file    Highest education level: Not on file   Occupational History    Not on file   Social Needs    Financial resource strain: Not on file    Food insecurity:     Worry: Not on file     Inability: Not on file    Transportation needs:     Medical: Not on file     Non-medical: Not on file   Tobacco Use    Smoking status: Never Smoker    Smokeless tobacco: Never Used   Substance and Sexual Activity    Alcohol use: Yes     Comment: Social    Drug use: No    Sexual activity: Not on file   Lifestyle    Physical activity:     Days per week: Not on file     Minutes per session: Not on file    Stress: Not on file   Relationships    Social connections:     Talks on phone: Not on file     Gets together: Not on file     Attends Oriental orthodox service: Not on file     Active member of club or organization: Not on file     Attends meetings of clubs or organizations: Not on file     Relationship status: Not on file    Intimate partner violence:     Fear of current or ex partner: Not on file     Emotionally abused: Not on file     Physically abused: Not on file     Forced sexual activity: Not on file   Other Topics Concern    Not on file   Social History Narrative    Exercises regularly       Objective     Vitals:    02/23/19 0808   BP: 130/92   Pulse:    Resp:    Temp:      Wt Readings from Last 3 Encounters:   02/23/19 99 2 kg (218 lb 12 8 oz)   01/15/19 101 kg (222 lb)   12/28/18 98 4 kg (217 lb)       Physical Exam   Constitutional: No distress  Cardiovascular:   Regular rate and rhythm with no murmurs   Pulmonary/Chest:   Lungs are clear to auscultation without wheezes,rales, or rhonchi   Musculoskeletal: He exhibits no edema         Pertinent Laboratory/Diagnostic Studies:  Lab Results   Component Value Date    GLUCOSE 106 (H) 02/27/2017    BUN 16 03/16/2018    CREATININE 0 60 (L) 02/27/2017    CALCIUM 9 8 03/16/2018     02/27/2017    K 4 4 03/16/2018    CO2 28 03/16/2018     03/16/2018     Lab Results   Component Value Date    ALT 24 03/16/2018    AST 20 03/16/2018    ALKPHOS 53 03/16/2018    BILITOT 0 4 02/27/2017       Lab Results   Component Value Date    WBC 3 7 (L) 05/11/2018    HGB 15 8 05/11/2018    HCT 45 3 05/11/2018    MCV 92 8 05/11/2018     05/11/2018       No results found for: TSH    Lab Results   Component Value Date    CHOL 184 02/27/2017     Lab Results   Component Value Date    TRIG 148 03/16/2018     Lab Results   Component Value Date    HDL 48 03/16/2018     Lab Results   Component Value Date    LDLCALC 98 02/27/2017     No results found for: HGBA1C    Results for orders placed or performed during the hospital encounter of 06/15/18   Tissue Exam   Result Value Ref Range    Case Report       Surgical Pathology Report                         Case: [id number]                                   Authorizing Provider:  Elsa Art MD           Collected:           06/15/2018 1241 Ordering Location:     [address] [place] [address]        Received:            06/15/2018 [facility]                                                     Pathologist:           Thelma Gold MD                                                               Specimen:    Esophagus, proximal esophagus r/o eosinophil esopnagitis                                   Final Diagnosis       A  Proximal esophagus (biopsy):  - Esophageal mucosa with rare eosinophils (range 0-2/HPF)  - No intestinal metaplasia     Interpretation performed at [facility], [address] [place] [id number]        Additional Information       All controls performed with the immunohistochemical stains reported above reacted appropriately  These tests were developed and their performance characteristics determined by [address] [place] [address] or [facility]  They may not be cleared or approved by the [place]  Food and Drug Administration  The FDA has determined that such clearance or approval is not necessary  These tests are used for clinical purposes  They should not be regarded as investigational or for research  This laboratory has been approved by [place] 88, designated as a high-complexity laboratory and is qualified to perform these tests  Gross Description       A  The specimen is received in formalin, labeled with the patient's name and medical record number, and is designated "proximal esophageal biopsy rule out eosinophilic esophagitis  The specimen consists of 3 colorless to pink, focally friable soft tissue fragments ranging from 0 2-0 4 cm in greatest dimension  The specimen is entirely submitted in 1 cassette  Note: The estimated total formalin fixation time based upon information provided by the submitting clinician and the standard processing schedule is under 72 hours       Mike         No orders of the defined types were placed in this encounter  ALLERGIES:  No Known Allergies    Current Medications     Current Outpatient Medications   Medication Sig Dispense Refill    hydrochlorothiazide (HYDRODIURIL) 25 mg tablet Take 1 tablet (25 mg total) by mouth daily 30 tablet 3    omeprazole (PriLOSEC) 20 mg delayed release capsule TAKE 2 CAPSULES BY MOUTH TWICE A DAY 60 capsule 5    olmesartan (BENICAR) 20 mg tablet Take 1 tablet (20 mg total) by mouth daily 30 tablet 3     No current facility-administered medications for this visit            Health Maintenance     Health Maintenance   Topic Date Due    BMI: Followup Plan  09/28/1993    Depression Screening PHQ  03/19/2019    BMI: Adult  02/23/2020    DTaP,Tdap,and Td Vaccines (8 - Td) 02/15/2027    INFLUENZA VACCINE  Completed    HEPATITIS B VACCINES  Completed     Immunization History   Administered Date(s) Administered    DT (pediatric) 07/19/1990    DTP 1975, 02/13/1976, 05/18/1976, 12/21/1976, 06/03/1981    Hep B, Adolescent or Pediatric 04/01/1993, 05/08/1993, 05/06/1994    INFLUENZA 11/06/2018    Influenza, injectable, quadrivalent, preservative free 0 5 mL 11/06/2018    MMR 09/29/1976, 06/22/1982    OPV 01/13/1976, 03/17/1976, 06/22/1976, 04/04/1977    Tdap 06/16/2006, 57/30/1753       Chika Arguelles MD

## 2019-02-23 NOTE — ASSESSMENT & PLAN NOTE
Hypertension  Patient blood pressure is still above goal   He was willing to try Olmesartan 20 mg and continue with his hydrochlorothiazide 25 mg once a day  He will monitor blood pressures and will call the office if blood pressure is maintained above 140/90 or should he develop any side effects    We will titrate medication accordingly

## 2019-03-11 ENCOUNTER — HOSPITAL ENCOUNTER (OUTPATIENT)
Dept: RADIOLOGY | Facility: CLINIC | Age: 44
Discharge: HOME | End: 2019-03-11
Admitting: ANESTHESIOLOGY
Payer: COMMERCIAL

## 2019-03-11 ENCOUNTER — TELEPHONE (OUTPATIENT)
Dept: RADIOLOGY | Facility: CLINIC | Age: 44
End: 2019-03-11

## 2019-03-11 VITALS
TEMPERATURE: 97.8 F | SYSTOLIC BLOOD PRESSURE: 113 MMHG | RESPIRATION RATE: 18 BRPM | HEART RATE: 62 BPM | OXYGEN SATURATION: 97 % | DIASTOLIC BLOOD PRESSURE: 77 MMHG

## 2019-03-11 DIAGNOSIS — M54.50 LOW BACK PAIN: ICD-10-CM

## 2019-03-11 DIAGNOSIS — M47.816 LUMBAR SPONDYLOSIS: ICD-10-CM

## 2019-03-11 PROCEDURE — 64635 DESTROY LUMB/SAC FACET JNT: CPT | Performed by: ANESTHESIOLOGY

## 2019-03-11 PROCEDURE — 64636 DESTROY L/S FACET JNT ADDL: CPT | Performed by: ANESTHESIOLOGY

## 2019-03-11 RX ORDER — LIDOCAINE HYDROCHLORIDE 10 MG/ML
5 INJECTION, SOLUTION EPIDURAL; INFILTRATION; INTRACAUDAL; PERINEURAL ONCE
Status: COMPLETED | OUTPATIENT
Start: 2019-03-11 | End: 2019-03-11

## 2019-03-11 RX ADMIN — LIDOCAINE HYDROCHLORIDE 4 ML: 20 INJECTION, SOLUTION EPIDURAL; INFILTRATION; INTRACAUDAL at 13:22

## 2019-03-11 RX ADMIN — LIDOCAINE HYDROCHLORIDE 5 ML: 10 INJECTION, SOLUTION EPIDURAL; INFILTRATION; INTRACAUDAL; PERINEURAL at 13:21

## 2019-03-11 NOTE — TELEPHONE ENCOUNTER
Patient is S/P a Right L3,L4,L5 Dorsal Ramus RFA on 3/11/19  Please call on 3/12/19 post OPRO   Thanks

## 2019-03-11 NOTE — H&P
History of Present Illness: The patient is a 37 y o  male who presents with complaints of low back pain  Patient Active Problem List   Diagnosis    Routine health maintenance    Hypertension    Generalized anxiety disorder    Food sticks on swallowing    Fatigue    SOBOE (shortness of breath on exertion)    Low back pain without sciatica    Anxiety    Neck pain    Numbness and tingling    Upper respiratory tract infection    Facial flushing    Allergy to cats    History of positive PPD    Impaired fasting glucose       Past Medical History:   Diagnosis Date    Cervical disc disorder     c6,c7, epidural injections for pain management    Fatigue     Generalized anxiety disorder     pt denies    Hypertension     SOBOE (shortness of breath on exertion)     pt denies       Past Surgical History:   Procedure Laterality Date    ESOPHAGOGASTRODUODENOSCOPY      LA ESOPHAGOGASTRODUODENOSCOPY TRANSORAL DIAGNOSTIC N/A 6/15/2018    Procedure: ESOPHAGOGASTRODUODENOSCOPY (EGD); Surgeon: Tommie Holstein, MD;  Location: [facility];   Service: Gastroenterology         Current Outpatient Medications:     hydrochlorothiazide (HYDRODIURIL) 25 mg tablet, Take 1 tablet (25 mg total) by mouth daily, Disp: 30 tablet, Rfl: 3    olmesartan (BENICAR) 20 mg tablet, Take 1 tablet (20 mg total) by mouth daily, Disp: 30 tablet, Rfl: 3    omeprazole (PriLOSEC) 20 mg delayed release capsule, TAKE 2 CAPSULES BY MOUTH TWICE A DAY, Disp: 60 capsule, Rfl: 5    Current Facility-Administered Medications:     lidocaine (PF) (XYLOCAINE-MPF) 1 % injection 5 mL, 5 mL, Other, Once, Andrés Dailey,     lidocaine (PF) (XYLOCAINE-MPF) 2 % injection 5 mL, 5 mL, Perineural, Once, Andrés Dailey DO    No Known Allergies    Physical Exam:   Vitals:    03/11/19 1302   BP: 112/77   Pulse: 100   Resp: 18   Temp: 97 8 °F (36 6 °C)   SpO2: 95%     General: Awake, Alert, Oriented x 3, Mood and affect appropriate  Respiratory: Respirations even and unlabored  Cardiovascular: Peripheral pulses intact; no edema  Musculoskeletal Exam:  Decreased range of motion lumbar spine    ASA Score: II    Patient/Chart Verification  Patient ID Verified: Verbal  ID Band Applied: No  Consents Confirmed: Procedural, To be obtained in the Pre-Procedure area  H&P( within 30 days) Verified: To be obtained in the Pre-Procedure area  Interval H&P(within 24 hr) Complete (required for Outpatients and Surgery Admit only): To be obtained in the Pre-Procedure area  Allergies Reviewed: Yes  Anticoag/NSAID held?: NA  Currently on antibiotics?: No    Assessment:   1  Lumbar spondylosis    2  Low back pain        Plan: RT L3,L4  L5 DORSAL RAMUS RFA

## 2019-03-12 NOTE — TELEPHONE ENCOUNTER
4/1/2019 @ 1245 L L3, L4, L5  RFA  4/15/2019 @ 1110 ALEXANDRO #2  4/30/2019 @ 0830 RFA DIMAS ABDUL w/ ADAM

## 2019-03-14 DIAGNOSIS — K22.2 ESOPHAGEAL STRICTURE: ICD-10-CM

## 2019-03-14 RX ORDER — OMEPRAZOLE 20 MG/1
CAPSULE, DELAYED RELEASE ORAL
Qty: 60 CAPSULE | Refills: 4 | Status: SHIPPED | OUTPATIENT
Start: 2019-03-14 | End: 2019-08-19 | Stop reason: SDUPTHER

## 2019-03-14 NOTE — TELEPHONE ENCOUNTER
S/w pt, stated that he is "doing good" after hs rfa of 3/11  Advised pt, allow 2-6 weeks for the full effect  Cb if redness, drainage, swelling at the site, fever 100+ or sunburn like sensation in the area of the procedure present  Pt verbalized understanding and confirmed 4/1 procedure

## 2019-03-17 DIAGNOSIS — I10 HYPERTENSION, UNSPECIFIED TYPE: ICD-10-CM

## 2019-03-21 RX ORDER — LOSARTAN POTASSIUM 100 MG/1
TABLET ORAL
Qty: 30 TABLET | Refills: 0 | OUTPATIENT
Start: 2019-03-21

## 2019-03-29 ENCOUNTER — TELEPHONE (OUTPATIENT)
Dept: RADIOLOGY | Facility: CLINIC | Age: 44
End: 2019-03-29

## 2019-03-29 NOTE — TELEPHONE ENCOUNTER
Pt canceled procedure RFA via [facility], called pt to make aware there was another appt in addition to that procedure appt for 4/30/19 and we canceled that day as well  Advised pt to rtc to [facility]  to r/s procedure and to confirm another future appt for procedure also

## 2019-04-03 ENCOUNTER — TELEPHONE (OUTPATIENT)
Dept: RADIOLOGY | Facility: CLINIC | Age: 44
End: 2019-04-03

## 2019-04-23 DIAGNOSIS — I10 HYPERTENSION, UNSPECIFIED TYPE: ICD-10-CM

## 2019-04-23 RX ORDER — LOSARTAN POTASSIUM 50 MG/1
TABLET ORAL
Qty: 90 TABLET | Refills: 1 | OUTPATIENT
Start: 2019-04-23

## 2019-05-07 DIAGNOSIS — I10 HYPERTENSION, UNSPECIFIED TYPE: ICD-10-CM

## 2019-05-07 RX ORDER — HYDROCHLOROTHIAZIDE 25 MG/1
TABLET ORAL
Qty: 30 TABLET | Refills: 3 | Status: SHIPPED | OUTPATIENT
Start: 2019-05-07 | End: 2019-08-05 | Stop reason: SDUPTHER

## 2019-05-07 RX ORDER — AMLODIPINE BESYLATE 5 MG/1
TABLET ORAL
Qty: 30 TABLET | Refills: 3 | OUTPATIENT
Start: 2019-05-07

## 2019-05-13 ENCOUNTER — TELEPHONE (OUTPATIENT)
Dept: FAMILY MEDICINE CLINIC | Facility: CLINIC | Age: 44
End: 2019-05-13

## 2019-05-13 DIAGNOSIS — Z92.89 HISTORY OF POSITIVE PPD: Primary | ICD-10-CM

## 2019-05-18 DIAGNOSIS — I10 HYPERTENSION, UNSPECIFIED TYPE: ICD-10-CM

## 2019-05-20 LAB
M TB IFN-G CD4+ BCKGRND COR BLD-ACNC: 0.09 IU/ML
M TB IFN-G CD4+ BCKGRND COR BLD-ACNC: 0.11 IU/ML
M TB IFN-G CD4+ T-CELLS BLD-ACNC: 0.03 IU/ML
M TB TUBERC IFN-G BLD QL: NEGATIVE
M TB TUBERC IGNF/MITOGEN IGNF CONTROL: 9.63 IU/ML

## 2019-05-20 RX ORDER — OLMESARTAN MEDOXOMIL 20 MG/1
TABLET ORAL
Qty: 30 TABLET | Refills: 5 | Status: SHIPPED | OUTPATIENT
Start: 2019-05-20 | End: 2019-10-03 | Stop reason: SDUPTHER

## 2019-07-08 DIAGNOSIS — I10 HYPERTENSION, UNSPECIFIED TYPE: ICD-10-CM

## 2019-07-08 RX ORDER — AMLODIPINE BESYLATE 5 MG/1
TABLET ORAL
Qty: 90 TABLET | Refills: 1 | OUTPATIENT
Start: 2019-07-08

## 2019-07-08 RX ORDER — LOSARTAN POTASSIUM 100 MG/1
TABLET ORAL
Qty: 90 TABLET | Refills: 1 | OUTPATIENT
Start: 2019-07-08

## 2019-07-08 RX ORDER — LOSARTAN POTASSIUM 50 MG/1
TABLET ORAL
Qty: 90 TABLET | Refills: 1 | OUTPATIENT
Start: 2019-07-08

## 2019-07-09 NOTE — TELEPHONE ENCOUNTER
Can you please let pharmacy know that the patient is no longer on losartan or amlodipine    Can you ask them to remove these prescriptions from their list   Thank you

## 2019-07-17 DIAGNOSIS — I10 HYPERTENSION, UNSPECIFIED TYPE: ICD-10-CM

## 2019-07-17 NOTE — TELEPHONE ENCOUNTER
I received a refill request for patient's hydrochlorothiazide  Can you please confirm the dose  I am receiving a request for 12 5 mg but I believe he is on 25 mg?   Thank you

## 2019-07-18 RX ORDER — HYDROCHLOROTHIAZIDE 12.5 MG/1
TABLET ORAL
Qty: 90 TABLET | Refills: 1 | OUTPATIENT
Start: 2019-07-18

## 2019-08-04 DIAGNOSIS — I10 HYPERTENSION, UNSPECIFIED TYPE: ICD-10-CM

## 2019-08-05 DIAGNOSIS — I10 HYPERTENSION, UNSPECIFIED TYPE: Primary | ICD-10-CM

## 2019-08-05 RX ORDER — HYDROCHLOROTHIAZIDE 25 MG/1
25 TABLET ORAL DAILY
Qty: 30 TABLET | Refills: 3 | Status: SHIPPED | OUTPATIENT
Start: 2019-08-05 | End: 2019-10-29 | Stop reason: SDUPTHER

## 2019-08-05 RX ORDER — HYDROCHLOROTHIAZIDE 25 MG/1
TABLET ORAL
Qty: 90 TABLET | Refills: 1 | OUTPATIENT
Start: 2019-08-05

## 2019-08-05 NOTE — TELEPHONE ENCOUNTER
Sorry this is the rigth dose he is taking  He is on the 25 mg, he is not on the 12 5 mg    Disregard the previous message   Thank you

## 2019-08-19 DIAGNOSIS — K22.2 ESOPHAGEAL STRICTURE: ICD-10-CM

## 2019-08-20 RX ORDER — OMEPRAZOLE 20 MG/1
CAPSULE, DELAYED RELEASE ORAL
Qty: 180 CAPSULE | Refills: 6 | Status: SHIPPED | OUTPATIENT
Start: 2019-08-20 | End: 2020-08-24

## 2019-09-20 ENCOUNTER — OFFICE VISIT (OUTPATIENT)
Dept: FAMILY MEDICINE CLINIC | Facility: CLINIC | Age: 44
End: 2019-09-20
Payer: COMMERCIAL

## 2019-09-20 VITALS
RESPIRATION RATE: 16 BRPM | DIASTOLIC BLOOD PRESSURE: 90 MMHG | HEART RATE: 82 BPM | BODY MASS INDEX: 29.85 KG/M2 | HEIGHT: 73 IN | TEMPERATURE: 95.7 F | OXYGEN SATURATION: 98 % | SYSTOLIC BLOOD PRESSURE: 118 MMHG | WEIGHT: 225.25 LBS

## 2019-09-20 DIAGNOSIS — M25.512 CHRONIC LEFT SHOULDER PAIN: Primary | ICD-10-CM

## 2019-09-20 DIAGNOSIS — G89.29 CHRONIC LEFT SHOULDER PAIN: Primary | ICD-10-CM

## 2019-09-20 PROCEDURE — 3008F BODY MASS INDEX DOCD: CPT | Performed by: FAMILY MEDICINE

## 2019-09-20 PROCEDURE — 99213 OFFICE O/P EST LOW 20 MIN: CPT | Performed by: FAMILY MEDICINE

## 2019-09-20 RX ORDER — TRAMADOL HYDROCHLORIDE 50 MG/1
50 TABLET ORAL EVERY 8 HOURS PRN
Qty: 30 TABLET | Refills: 0 | Status: SHIPPED | OUTPATIENT
Start: 2019-09-20

## 2019-09-20 NOTE — ASSESSMENT & PLAN NOTE
Shoulder pain  Patient with suspected rotator cuff tear with some weakness and significant discomfort  The patient was given prescription to schedule MRI of left shoulder for further evaluation  He was also given tramadol to use 1 every 8 hours as needed for pain since he does have a history of GERD and currently on PPI medication  He is aware to not operate motor vehicle under the influence of tramadol    We will make further recommendations pending results of test

## 2019-09-20 NOTE — PROGRESS NOTES
FAMILY PRACTICE OFFICE VISIT       NAME: Kelsy Nice  AGE: 37 y o  SEX: male       : 1975        MRN: [id number]    DATE: 2019  TIME: 11:54 AM    Assessment and Plan     Problem List Items Addressed This Visit        Other    Chronic left shoulder pain - Primary     Shoulder pain  Patient with suspected rotator cuff tear with some weakness and significant discomfort  The patient was given prescription to schedule MRI of left shoulder for further evaluation  He was also given tramadol to use 1 every 8 hours as needed for pain since he does have a history of GERD and currently on PPI medication  He is aware to not operate motor vehicle under the influence of tramadol  We will make further recommendations pending results of test          Relevant Medications    traMADol (ULTRAM) 50 mg tablet    Other Relevant Orders    MRI shoulder left wo contrast              Chief Complaint     Chief Complaint   Patient presents with    shoulder and neck pain     x week  , otc motrin        History of Present Illness     Patient states he has had many years history of left shoulder discomfort when putting on a jacket but otherwise has felt fine  More recently in the past month or so he has had increased discomfort with left shoulder where he has difficulties elevating beyond 90°  Symptoms are better in the morning only to worsen throughout the day with continued use  He does have a history of dislocation of the shoulder when he was in his 25s playing football in high school and college  He is right handed  Review of Systems   Review of Systems   Constitutional: Negative      Musculoskeletal:        As per HPI       Active Problem List     Patient Active Problem List   Diagnosis    Routine health maintenance    Hypertension    Generalized anxiety disorder    Food sticks on swallowing    Fatigue    SOBOE (shortness of breath on exertion)    Low back pain without sciatica    Anxiety    Neck pain  Numbness and tingling    Upper respiratory tract infection    Facial flushing    Allergy to cats    History of positive PPD    Impaired fasting glucose    Chronic left shoulder pain       Past Medical History:  Past Medical History:   Diagnosis Date    Cervical disc disorder     c6,c7, epidural injections for pain management    Fatigue     Generalized anxiety disorder     pt denies    Hypertension     SOBOE (shortness of breath on exertion)     pt denies       Past Surgical History:  Past Surgical History:   Procedure Laterality Date    ESOPHAGOGASTRODUODENOSCOPY      MS ESOPHAGOGASTRODUODENOSCOPY TRANSORAL DIAGNOSTIC N/A 6/15/2018    Procedure: ESOPHAGOGASTRODUODENOSCOPY (EGD); Surgeon: Lisa Whyte MD;  Location: [facility];   Service: Gastroenterology       Family History:  Family History   Problem Relation Age of Onset    Hypertension Mother    Thea Birch Gout Father        Social History:  Social History     Socioeconomic History    Marital status: /Civil Union     Spouse name: Not on file    Number of children: Not on file    Years of education: Not on file    Highest education level: Not on file   Occupational History    Not on file   Social Needs    Financial resource strain: Not on file    Food insecurity:     Worry: Not on file     Inability: Not on file    Transportation needs:     Medical: Not on file     Non-medical: Not on file   Tobacco Use    Smoking status: Never Smoker    Smokeless tobacco: Never Used   Substance and Sexual Activity    Alcohol use: Yes     Comment: Social    Drug use: No    Sexual activity: Not on file   Lifestyle    Physical activity:     Days per week: Not on file     Minutes per session: Not on file    Stress: Not on file   Relationships    Social connections:     Talks on phone: Not on file     Gets together: Not on file     Attends Tenriism service: Not on file     Active member of club or organization: Not on file     Attends meetings of clubs or organizations: Not on file     Relationship status: Not on file    Intimate partner violence:     Fear of current or ex partner: Not on file     Emotionally abused: Not on file     Physically abused: Not on file     Forced sexual activity: Not on file   Other Topics Concern    Not on file   Social History Narrative    Exercises regularly       Objective     Vitals:    09/20/19 1129   BP: 118/90   Pulse: 82   Resp: 16   Temp: (!) 95 7 °F (35 4 °C)   SpO2: 98%     Wt Readings from Last 3 Encounters:   09/20/19 102 kg (225 lb 4 oz)   02/23/19 99 2 kg (218 lb 12 8 oz)   01/15/19 101 kg (222 lb)       Physical Exam   Constitutional: No distress  Musculoskeletal:   Patient muscle strength 5/5 however he does have difficulties elevating left shoulder be on 100° with abduction for elevation  Discomfort is slightly increased with internal and external rotation  5/5 grasping of hand  Mild tenderness to palpation along anterior posterior shoulder    Deep tendon reflexes +2       Pertinent Laboratory/Diagnostic Studies:  Lab Results   Component Value Date    GLUCOSE 106 (H) 02/27/2017    BUN 16 03/16/2018    CREATININE 1 03 03/16/2018    CALCIUM 9 8 03/16/2018     02/27/2017    K 4 4 03/16/2018    CO2 28 03/16/2018     03/16/2018     Lab Results   Component Value Date    ALT 24 03/16/2018    AST 20 03/16/2018    ALKPHOS 53 03/16/2018    BILITOT 0 4 02/27/2017       Lab Results   Component Value Date    WBC 3 7 (L) 05/11/2018    HGB 15 8 05/11/2018    HCT 45 3 05/11/2018    MCV 92 8 05/11/2018     05/11/2018       No results found for: TSH    Lab Results   Component Value Date    CHOL 184 02/27/2017     Lab Results   Component Value Date    TRIG 148 03/16/2018     Lab Results   Component Value Date    HDL 48 03/16/2018     Lab Results   Component Value Date    LDLCALC 98 02/27/2017     No results found for: HGBA1C    Results for orders placed or performed in visit on 05/16/19   Quantiferon-TB Gold Plus, 1 tube   Result Value Ref Range    [facility] Quantiferon(R)-TB Gold Plus, 1 Tube NEGATIVE NEGATIVE    NIL 0 03 IU/mL    Mitogen-NIL 9 63 IU/mL    TB1-NIL 0 09 IU/mL    TB2-NIL 0 11 IU/mL       Orders Placed This Encounter   Procedures    MRI shoulder left wo contrast       ALLERGIES:  No Known Allergies    Current Medications     Current Outpatient Medications   Medication Sig Dispense Refill    hydrochlorothiazide (HYDRODIURIL) 25 mg tablet Take 1 tablet (25 mg total) by mouth daily 30 tablet 3    olmesartan (BENICAR) 20 mg tablet TAKE 1 TABLET BY MOUTH EVERY DAY 30 tablet 5    omeprazole (PriLOSEC) 20 mg delayed release capsule TAKE 2 CAPSULES BY MOUTH TWICE A  capsule 6    traMADol (ULTRAM) 50 mg tablet Take 1 tablet (50 mg total) by mouth every 8 (eight) hours as needed for moderate pain 30 tablet 0     No current facility-administered medications for this visit            Health Maintenance     Health Maintenance   Topic Date Due    BMI: Followup Plan  09/28/1993    INFLUENZA VACCINE  07/01/2019    Depression Screening PHQ  09/20/2020    BMI: Adult  09/20/2020    DTaP,Tdap,and Td Vaccines (8 - Td) 02/15/2027    Pneumococcal Vaccine: 65+ Years (1 of 2 - PCV13) 09/28/2040    HEPATITIS B VACCINES  Completed    Pneumococcal Vaccine: Pediatrics (0 to 5 Years) and At-Risk Patients (6 to 59 Years)  Aged [employer] History   Administered Date(s) Administered    DT (pediatric) 07/19/1990    DTP 1975, 02/13/1976, 05/18/1976, 12/21/1976, 06/03/1981    Hep B, Adolescent or Pediatric 04/01/1993, 05/08/1993, 05/06/1994    INFLUENZA 11/06/2018    Influenza, injectable, quadrivalent, preservative free 0 5 mL 11/06/2018    MMR 09/29/1976, 06/22/1982    OPV 01/13/1976, 03/17/1976, 06/22/1976, 04/04/1977    Tdap 06/16/2006, 22/83/2162       Carlitos Lopes MD

## 2019-10-03 DIAGNOSIS — I10 HYPERTENSION, UNSPECIFIED TYPE: ICD-10-CM

## 2019-10-03 RX ORDER — OLMESARTAN MEDOXOMIL 20 MG/1
TABLET ORAL
Qty: 90 TABLET | Refills: 1 | Status: SHIPPED | OUTPATIENT
Start: 2019-10-03 | End: 2020-03-25

## 2019-10-29 DIAGNOSIS — I10 HYPERTENSION, UNSPECIFIED TYPE: ICD-10-CM

## 2019-10-29 RX ORDER — HYDROCHLOROTHIAZIDE 25 MG/1
TABLET ORAL
Qty: 90 TABLET | Refills: 1 | Status: SHIPPED | OUTPATIENT
Start: 2019-10-29 | End: 2020-05-07

## 2020-03-20 ENCOUNTER — OFFICE VISIT (OUTPATIENT)
Dept: FAMILY MEDICINE CLINIC | Facility: CLINIC | Age: 45
End: 2020-03-20
Payer: COMMERCIAL

## 2020-03-20 DIAGNOSIS — M10.9 GOUT, UNSPECIFIED CAUSE, UNSPECIFIED CHRONICITY, UNSPECIFIED SITE: Primary | ICD-10-CM

## 2020-03-20 PROCEDURE — 99213 OFFICE O/P EST LOW 20 MIN: CPT | Performed by: FAMILY MEDICINE

## 2020-03-20 RX ORDER — PREDNISONE 20 MG/1
TABLET ORAL
Qty: 10 TABLET | Refills: 0 | Status: SHIPPED | OUTPATIENT
Start: 2020-03-20

## 2020-03-20 NOTE — PROGRESS NOTES
Virtual Brief Visit    Reason for visit is gout attack      Encounter provider Mari Chavez MD    Provider located at [address] [place] [id number]      Recent Visits  No visits were found meeting these conditions  Showing recent visits within past 7 days and meeting all other requirements     Future Appointments  No visits were found meeting these conditions  Showing future appointments within next 150 days and meeting all other requirements        Patient agrees to participate in a virtual check in via telephone or video visit instead of presenting to the office to address urgent/immediate medical needs  Patient is aware this is a billable service  After connecting through telephone, the patient was identified by name and date of birth  Sangeetha Epps was informed that this was a telemedicine visit and that the visit is being conducted through telephone which may not be secure and therefore might not be HIPAA-compliant  My office door was closed  No one else was in the room  He acknowledged consent and understanding of privacy and security of the virtual check-in visit  I informed the patient that I have reviewed his record in Epic and presented the opportunity for him to ask any questions regarding the visit today  The patient initiated communication and agreed to participate  Subjective  Sangeetha Epps is a 40 y o  male who had a history of prior gout attack 8 years ago  He states 3 weeks ago he developed sudden onset of swelling and pain at and redness of his right great toe  He is unaware of any specific triggers to cause his symptoms  He was unable to come in for an office visit at that time and since then symptoms have improved however he continues to have some swelling and redness with mild tenderness which has not resolved as of yet  He denies any recent illness or fevers        Past Medical History:   Diagnosis Date    Cervical disc disorder     c6,c7, epidural injections for pain management    Fatigue     Generalized anxiety disorder     pt denies    Hypertension     SOBOE (shortness of breath on exertion)     pt denies       Past Surgical History:   Procedure Laterality Date    ESOPHAGOGASTRODUODENOSCOPY      [place] ESOPHAGOGASTRODUODENOSCOPY TRANSORAL DIAGNOSTIC N/A 6/15/2018    Procedure: ESOPHAGOGASTRODUODENOSCOPY (EGD); Surgeon: Kierra Haji MD;  Location: [facility]; Service: Gastroenterology       Current Outpatient Medications   Medication Sig Dispense Refill    hydrochlorothiazide (HYDRODIURIL) 25 mg tablet TAKE 1 TABLET BY MOUTH EVERY DAY 90 tablet 1    olmesartan (BENICAR) 20 mg tablet TAKE 1 TABLET BY MOUTH EVERY DAY 90 tablet 1    omeprazole (PriLOSEC) 20 mg delayed release capsule TAKE 2 CAPSULES BY MOUTH TWICE A  capsule 6    traMADol (ULTRAM) 50 mg tablet Take 1 tablet (50 mg total) by mouth every 8 (eight) hours as needed for moderate pain 30 tablet 0     No current facility-administered medications for this visit  No Known Allergies    Assessment    Michael telephone assessment is Patient in mild distress   Disposition:    Patient was given prescription for prednisone tapering dose to take 40 mg once daily with food for 5 days  Patient was advised to look up foods to avoid with gout  He will call if symptoms persist after medication completed    I spent 10 minutes with the patient during this virtual check-in visit

## 2020-03-25 DIAGNOSIS — I10 HYPERTENSION, UNSPECIFIED TYPE: ICD-10-CM

## 2020-03-25 RX ORDER — OLMESARTAN MEDOXOMIL 20 MG/1
TABLET ORAL
Qty: 90 TABLET | Refills: 1 | Status: SHIPPED | OUTPATIENT
Start: 2020-03-25 | End: 2020-09-14

## 2020-05-06 DIAGNOSIS — I10 HYPERTENSION, UNSPECIFIED TYPE: ICD-10-CM

## 2020-05-07 RX ORDER — HYDROCHLOROTHIAZIDE 25 MG/1
TABLET ORAL
Qty: 30 TABLET | Refills: 0 | Status: SHIPPED | OUTPATIENT
Start: 2020-05-07

## 2020-06-02 DIAGNOSIS — I10 HYPERTENSION, UNSPECIFIED TYPE: ICD-10-CM

## 2020-06-02 RX ORDER — HYDROCHLOROTHIAZIDE 25 MG/1
TABLET ORAL
Qty: 30 TABLET | Refills: 0 | OUTPATIENT
Start: 2020-06-02

## 2020-06-02 NOTE — TELEPHONE ENCOUNTER
Can you please ask patient's pharmacy to remove this medication from his refill list   He is no longer on this and I keep on getting refill requests for hydrochlorothiazide    Thank you

## 2020-06-22 ENCOUNTER — TELEPHONE (OUTPATIENT)
Dept: FAMILY MEDICINE CLINIC | Facility: CLINIC | Age: 45
End: 2020-06-22

## 2020-06-22 DIAGNOSIS — Z11.1 SCREENING FOR TUBERCULOSIS: Primary | ICD-10-CM

## 2020-08-23 DIAGNOSIS — K22.2 ESOPHAGEAL STRICTURE: ICD-10-CM

## 2020-08-24 RX ORDER — OMEPRAZOLE 20 MG/1
CAPSULE, DELAYED RELEASE ORAL
Qty: 180 CAPSULE | Refills: 6 | Status: SHIPPED | OUTPATIENT
Start: 2020-08-24 | End: 2021-11-09

## 2020-09-14 DIAGNOSIS — I10 HYPERTENSION, UNSPECIFIED TYPE: ICD-10-CM

## 2020-09-14 RX ORDER — OLMESARTAN MEDOXOMIL 20 MG/1
TABLET ORAL
Qty: 90 TABLET | Refills: 1 | Status: SHIPPED | OUTPATIENT
Start: 2020-09-14 | End: 2021-03-08

## 2020-09-29 ENCOUNTER — TELEMEDICINE (OUTPATIENT)
Dept: FAMILY MEDICINE CLINIC | Facility: CLINIC | Age: 45
End: 2020-09-29
Payer: COMMERCIAL

## 2020-09-29 ENCOUNTER — TELEPHONE (OUTPATIENT)
Dept: FAMILY MEDICINE CLINIC | Facility: CLINIC | Age: 45
End: 2020-09-29

## 2020-09-29 VITALS
WEIGHT: 219 LBS | SYSTOLIC BLOOD PRESSURE: 131 MMHG | HEART RATE: 101 BPM | BODY MASS INDEX: 29.03 KG/M2 | DIASTOLIC BLOOD PRESSURE: 85 MMHG | HEIGHT: 73 IN | TEMPERATURE: 100.3 F

## 2020-09-29 DIAGNOSIS — R51.9 NONINTRACTABLE HEADACHE, UNSPECIFIED CHRONICITY PATTERN, UNSPECIFIED HEADACHE TYPE: ICD-10-CM

## 2020-09-29 DIAGNOSIS — R50.9 LOW GRADE FEVER: ICD-10-CM

## 2020-09-29 DIAGNOSIS — Z20.822 EXPOSURE TO COVID-19 VIRUS: ICD-10-CM

## 2020-09-29 DIAGNOSIS — M54.6 ACUTE RIGHT-SIDED THORACIC BACK PAIN: Primary | ICD-10-CM

## 2020-09-29 PROCEDURE — 99213 OFFICE O/P EST LOW 20 MIN: CPT | Performed by: FAMILY MEDICINE

## 2020-09-29 PROCEDURE — 3075F SYST BP GE 130 - 139MM HG: CPT | Performed by: FAMILY MEDICINE

## 2020-09-29 PROCEDURE — 3725F SCREEN DEPRESSION PERFORMED: CPT | Performed by: FAMILY MEDICINE

## 2020-09-29 PROCEDURE — 1036F TOBACCO NON-USER: CPT | Performed by: FAMILY MEDICINE

## 2020-09-29 PROCEDURE — 3079F DIAST BP 80-89 MM HG: CPT | Performed by: FAMILY MEDICINE

## 2020-09-29 RX ORDER — CYCLOBENZAPRINE HCL 5 MG
TABLET ORAL
Qty: 20 TABLET | Refills: 0 | Status: SHIPPED | OUTPATIENT
Start: 2020-09-29

## 2020-09-29 RX ORDER — NAPROXEN 500 MG/1
TABLET ORAL
Qty: 14 TABLET | Refills: 0 | Status: SHIPPED | OUTPATIENT
Start: 2020-09-29

## 2020-09-29 NOTE — PROGRESS NOTES
Virtual Regular Visit      Assessment/Plan:    Problem List Items Addressed This Visit        Other    Acute right-sided thoracic back pain - Primary    Relevant Medications    naproxen (NAPROSYN) 500 mg tablet    cyclobenzaprine (FLEXERIL) 5 mg tablet    Other Relevant Orders    XR ribs right w pa chest min 3 views    Low grade fever    Relevant Orders    Novel Coronavirus (COVID-19), PCR [facility] - Collected at [place] [address] or [facility]    Nonintractable headache    Relevant Orders    Novel Coronavirus (COVID-19), PCR [facility] - Collected at [place] [address] or [facility]    Exposure to COVID-19 virus    Relevant Orders    Novel Coronavirus (COVID-19), PCR [facility] - Collected at [place] [address] or [facility]        70-year-old male presents today for virtual video telemedicine visit for evaluation of right-sided back pain that started on Saturday without any obvious injury  He did go golfing on Friday and carried 5 gal water bottles on Saturday however does not feel that he hurt himself  He also did stretch on Saturday morning and heard a popping in his neck  At present, denies neck pain  Denies URI symptoms, shortness of breath, abdominal symptoms such as nausea, vomiting, diarrhea  Right-sided back pain is aggravated with taking deep breaths and twisting his body to the left  This appears to be likely secondary to musculoskeletal strain/spasm and will start patient on naproxen to take with food as needed as well as Flexeril  I will also go ahead and order x-ray of ribs/chest   Because he has a temperature with headache, I will proceed with COVID testing although he does not have any exposure to sick contacts  In the meantime, if his symptoms should worsen, I have instructed him to go the emergency room or urgent care  I recommend isolating from all family members and remaining quarantine until COVID test results are back  He is agreeable with this plan         Reason for visit is to discuss right-sided back pain, low-grade temperature, headache that he feels this due to him not being able to sleep  Chief Complaint   Patient presents with    Fever     x 3days  otc motrin ,tramadol     Back Pain    Headache    Insomnia    Virtual Regular Visit        Encounter provider Mora Justice MD    Provider located at [address] [place] [address] [place]   [address]       Recent Visits  No visits were found meeting these conditions  Showing recent visits within past 7 days and meeting all other requirements     Today's Visits  Date Type Provider Dept   09/29/20 Telephone Mora Justice, [address] [facility] [place], [place]   09/29/20 Telemedicine Mora Justice, [address] [facility] today's visits and meeting all other requirements     Future Appointments  No visits were found meeting these conditions  Showing future appointments within next 150 days and meeting all other requirements        The patient was identified by name and date of birth  Jeancarlos Buckner was informed that this is a telemedicine visit and that the visit is being conducted through [address] [place] [address] and patient was informed that this is not a secure, HIPAA-complaint platform  He agrees to proceed     My office door was closed  No one else was in the room  He acknowledged consent and understanding of privacy and security of the video platform  The patient has agreed to participate and understands they can discontinue the visit at any time  Patient is aware this is a billable service  Subjective  Jeancarlos Buckner is a 39 y o  male presents today via virtual video telemedicine visit for evaluation of right-sided back pain  Patient states he does have a history of herniated discs, on Saturday was stretching and heard something pop in his neck  Patient also states he was carrying 5 gal of water on Saturday however does not feel that he may have hurt himself    He also went golfing on Friday, however did not have any injury  The right-sided back pain is aggravated with taking deep breaths and twisting to the left  When he does take a deep breath in twists to the left, it is described as a sharp pain  Patient states he has not been sleeping well for the past 2 days as he is unable to find a comfortable position  Because of this, he feels he has a headache  He has also had fever on and off with T-max of 100 3° since Saturday  Denies cough, shortness of breath, wheezing, chest tightness, URI symptoms  Denies loss of taste or smell  Denies abdominal pain, nausea, vomiting, diarrhea  He has taken Motrin, tramadol, applied he denies which is not helping  HPI     Past Medical History:   Diagnosis Date    Cervical disc disorder     c6,c7, epidural injections for pain management    Fatigue     Generalized anxiety disorder     pt denies    Hypertension     SOBOE (shortness of breath on exertion)     pt denies       Past Surgical History:   Procedure Laterality Date    ESOPHAGOGASTRODUODENOSCOPY      [place] ESOPHAGOGASTRODUODENOSCOPY TRANSORAL DIAGNOSTIC N/A 6/15/2018    Procedure: ESOPHAGOGASTRODUODENOSCOPY (EGD); Surgeon: Amalia Plasencia MD;  Location: [facility]; Service: Gastroenterology       Current Outpatient Medications   Medication Sig Dispense Refill    cyclobenzaprine (FLEXERIL) 5 mg tablet Take 1-2 tabs at bedtime as needed   Do not drive while on this medication 20 tablet 0    hydrochlorothiazide (HYDRODIURIL) 25 mg tablet TAKE 1 TABLET BY MOUTH EVERY DAY 30 tablet 0    naproxen (NAPROSYN) 500 mg tablet Take 1 tablet every 12 hours with food as needed 14 tablet 0    olmesartan (BENICAR) 20 mg tablet TAKE 1 TABLET BY MOUTH EVERY DAY 90 tablet 1    omeprazole (PriLOSEC) 20 mg delayed release capsule TAKE 2 CAPSULES BY MOUTH TWICE A  capsule 6    predniSONE 20 mg tablet Two tablets once daily with food 10 tablet 0    traMADol (ULTRAM) 50 mg tablet Take 1 tablet (50 mg total) by mouth every 8 (eight) hours as needed for moderate pain 30 tablet 0     No current facility-administered medications for this visit  No Known Allergies    Review of Systems   Constitutional:        Low-grade fever   HENT: Negative  Respiratory: Negative for cough, chest tightness, shortness of breath and wheezing  Musculoskeletal:        Right-sided back pain   Neurological: Positive for headaches  Video Exam  I have reviewed the patient's medical history in detail; there are no changes to the history as noted in the electronic medical record  Vitals:    09/29/20 1013   BP: 131/85   Pulse: 101   Temp: 100 3 °F (37 9 °C)   Weight: 99 3 kg (219 lb)   Height: 6' 1" (1 854 m)       Physical Exam  Vitals signs and nursing note reviewed  Constitutional:       General: He is not in acute distress  Appearance: Normal appearance  Pulmonary:      Effort: No respiratory distress  Musculoskeletal: Normal range of motion  Neurological:      Mental Status: He is alert and oriented to person, place, and time  VIRTUAL VISIT DISCLAIMER    Flip Hernandez acknowledges that he has consented to an online visit or consultation  He understands that the online visit is based solely on information provided by him, and that, in the absence of a face-to-face physical evaluation by the physician, the diagnosis he receives is both limited and provisional in terms of accuracy and completeness  This is not intended to replace a full medical face-to-face evaluation by the physician  Flip Hernandez understands and accepts these terms

## 2020-09-29 NOTE — TELEPHONE ENCOUNTER
Can you please let patient know that I would like him to recheck his blood pressure either later today or tomorrow and give me a call back along with his heart rate  Can you also patient if he has any urinary symptoms or history of kidney stones?   Thank you

## 2020-09-30 ENCOUNTER — HOSPITAL ENCOUNTER (OUTPATIENT)
Dept: RADIOLOGY | Facility: HOSPITAL | Age: 45
Discharge: HOME | End: 2020-09-30
Payer: COMMERCIAL

## 2020-09-30 DIAGNOSIS — R50.9 LOW GRADE FEVER: ICD-10-CM

## 2020-09-30 DIAGNOSIS — R51.9 NONINTRACTABLE HEADACHE, UNSPECIFIED CHRONICITY PATTERN, UNSPECIFIED HEADACHE TYPE: ICD-10-CM

## 2020-09-30 DIAGNOSIS — M54.6 ACUTE RIGHT-SIDED THORACIC BACK PAIN: ICD-10-CM

## 2020-09-30 DIAGNOSIS — Z20.822 EXPOSURE TO COVID-19 VIRUS: ICD-10-CM

## 2020-09-30 PROCEDURE — U0003 INFECTIOUS AGENT DETECTION BY NUCLEIC ACID (DNA OR RNA); SEVERE ACUTE RESPIRATORY SYNDROME CORONAVIRUS 2 (SARS-COV-2) (CORONAVIRUS DISEASE [COVID-19]), AMPLIFIED PROBE TECHNIQUE, MAKING USE OF HIGH THROUGHPUT TECHNOLOGIES AS DESCRIBED BY CMS-2020-01-R: HCPCS | Performed by: FAMILY MEDICINE

## 2020-09-30 PROCEDURE — 71101 X-RAY EXAM UNILAT RIBS/CHEST: CPT

## 2020-09-30 NOTE — TELEPHONE ENCOUNTER
Gave patient the information and he will call back with his B/P and Heart Rate numbers    He will go do his xray and also the covid test

## 2020-10-01 ENCOUNTER — TELEPHONE (OUTPATIENT)
Dept: FAMILY MEDICINE CLINIC | Facility: CLINIC | Age: 45
End: 2020-10-01

## 2020-10-01 LAB — SARS-COV-2 RNA SPEC QL NAA+PROBE: NOT DETECTED

## 2020-10-02 ENCOUNTER — TELEPHONE (OUTPATIENT)
Dept: FAMILY MEDICINE CLINIC | Facility: CLINIC | Age: 45
End: 2020-10-02

## 2021-03-07 DIAGNOSIS — I10 HYPERTENSION, UNSPECIFIED TYPE: ICD-10-CM

## 2021-03-08 RX ORDER — OLMESARTAN MEDOXOMIL 20 MG/1
TABLET ORAL
Qty: 90 TABLET | Refills: 1 | Status: SHIPPED | OUTPATIENT
Start: 2021-03-08 | End: 2021-09-09

## 2021-03-10 ENCOUNTER — TELEPHONE (OUTPATIENT)
Dept: FAMILY MEDICINE CLINIC | Facility: CLINIC | Age: 46
End: 2021-03-10

## 2021-03-10 DIAGNOSIS — I10 HYPERTENSION, UNSPECIFIED TYPE: Primary | ICD-10-CM

## 2021-03-10 NOTE — TELEPHONE ENCOUNTER
I will place order in [facility] and patient may go to laboratory to obtain blood work prior to office visit

## 2021-03-10 NOTE — TELEPHONE ENCOUNTER
Patient has an appointment for Monday, he wants to know what blood work he needs for this appointment

## 2021-03-11 ENCOUNTER — APPOINTMENT (OUTPATIENT)
Dept: LAB | Facility: CLINIC | Age: 46
End: 2021-03-11
Payer: COMMERCIAL

## 2021-03-11 DIAGNOSIS — I10 HYPERTENSION, UNSPECIFIED TYPE: ICD-10-CM

## 2021-03-11 LAB
ALBUMIN SERPL BCP-MCNC: 4.5 G/DL (ref 3.5–5)
ALP SERPL-CCNC: 56 U/L (ref 46–116)
ALT SERPL W P-5'-P-CCNC: 25 U/L (ref 12–78)
ANION GAP SERPL CALCULATED.3IONS-SCNC: 4 MMOL/L (ref 4–13)
AST SERPL W P-5'-P-CCNC: 14 U/L (ref 5–45)
BILIRUB SERPL-MCNC: 0.73 MG/DL (ref 0.2–1)
BUN SERPL-MCNC: 22 MG/DL (ref 5–25)
CALCIUM SERPL-MCNC: 9.6 MG/DL (ref 8.3–10.1)
CHLORIDE SERPL-SCNC: 104 MMOL/L (ref 100–108)
CHOLEST SERPL-MCNC: 212 MG/DL (ref 50–200)
CO2 SERPL-SCNC: 30 MMOL/L (ref 21–32)
CREAT SERPL-MCNC: 1.13 MG/DL (ref 0.6–1.3)
ERYTHROCYTE [DISTWIDTH] IN BLOOD BY AUTOMATED COUNT: 12.1 % (ref 11.6–15.1)
GFR SERPL CREATININE-BSD FRML MDRD: 78 ML/MIN/1.73SQ M
GLUCOSE P FAST SERPL-MCNC: 100 MG/DL (ref 65–99)
HCT VFR BLD AUTO: 44.3 % (ref 36.5–49.3)
HDLC SERPL-MCNC: 53 MG/DL
HGB BLD-MCNC: 14.8 G/DL (ref 12–17)
LDLC SERPL CALC-MCNC: 137 MG/DL (ref 0–100)
MCH RBC QN AUTO: 31.4 PG (ref 26.8–34.3)
MCHC RBC AUTO-ENTMCNC: 33.4 G/DL (ref 31.4–37.4)
MCV RBC AUTO: 94 FL (ref 82–98)
NONHDLC SERPL-MCNC: 159 MG/DL
PLATELET # BLD AUTO: 171 THOUSANDS/UL (ref 149–390)
PMV BLD AUTO: 10.1 FL (ref 8.9–12.7)
POTASSIUM SERPL-SCNC: 4.6 MMOL/L (ref 3.5–5.3)
PROT SERPL-MCNC: 7.5 G/DL (ref 6.4–8.2)
RBC # BLD AUTO: 4.71 MILLION/UL (ref 3.88–5.62)
SODIUM SERPL-SCNC: 138 MMOL/L (ref 136–145)
TRIGL SERPL-MCNC: 109 MG/DL
TSH SERPL DL<=0.05 MIU/L-ACNC: 1.72 UIU/ML (ref 0.36–3.74)
WBC # BLD AUTO: 3.48 THOUSAND/UL (ref 4.31–10.16)

## 2021-03-11 PROCEDURE — 80053 COMPREHEN METABOLIC PANEL: CPT

## 2021-03-11 PROCEDURE — 85027 COMPLETE CBC AUTOMATED: CPT

## 2021-03-11 PROCEDURE — 36415 COLL VENOUS BLD VENIPUNCTURE: CPT

## 2021-03-11 PROCEDURE — 84443 ASSAY THYROID STIM HORMONE: CPT

## 2021-03-11 PROCEDURE — 80061 LIPID PANEL: CPT

## 2021-03-15 ENCOUNTER — OFFICE VISIT (OUTPATIENT)
Dept: FAMILY MEDICINE CLINIC | Facility: CLINIC | Age: 46
End: 2021-03-15
Payer: COMMERCIAL

## 2021-03-15 VITALS
HEART RATE: 64 BPM | BODY MASS INDEX: 29.37 KG/M2 | HEIGHT: 73 IN | DIASTOLIC BLOOD PRESSURE: 84 MMHG | WEIGHT: 221.6 LBS | TEMPERATURE: 97.8 F | SYSTOLIC BLOOD PRESSURE: 110 MMHG | RESPIRATION RATE: 15 BRPM | OXYGEN SATURATION: 99 %

## 2021-03-15 DIAGNOSIS — D72.829 LEUKOCYTOSIS, UNSPECIFIED TYPE: Primary | ICD-10-CM

## 2021-03-15 DIAGNOSIS — L30.9 DERMATITIS: ICD-10-CM

## 2021-03-15 DIAGNOSIS — I10 HYPERTENSION, UNSPECIFIED TYPE: ICD-10-CM

## 2021-03-15 DIAGNOSIS — Z00.00 WELL ADULT EXAM: ICD-10-CM

## 2021-03-15 DIAGNOSIS — D72.819 LEUKOPENIA, UNSPECIFIED TYPE: ICD-10-CM

## 2021-03-15 PROBLEM — R53.83 FATIGUE: Status: RESOLVED | Noted: 2018-03-20 | Resolved: 2021-03-15

## 2021-03-15 PROBLEM — R20.0 NUMBNESS AND TINGLING: Status: RESOLVED | Noted: 2018-12-20 | Resolved: 2021-03-15

## 2021-03-15 PROBLEM — R23.2 FACIAL FLUSHING: Status: RESOLVED | Noted: 2018-12-20 | Resolved: 2021-03-15

## 2021-03-15 PROBLEM — R20.2 NUMBNESS AND TINGLING: Status: RESOLVED | Noted: 2018-12-20 | Resolved: 2021-03-15

## 2021-03-15 PROBLEM — R50.9 LOW GRADE FEVER: Status: RESOLVED | Noted: 2020-09-29 | Resolved: 2021-03-15

## 2021-03-15 PROBLEM — J06.9 UPPER RESPIRATORY TRACT INFECTION: Status: RESOLVED | Noted: 2018-12-20 | Resolved: 2021-03-15

## 2021-03-15 PROBLEM — R06.02 SOBOE (SHORTNESS OF BREATH ON EXERTION): Status: RESOLVED | Noted: 2018-03-20 | Resolved: 2021-03-15

## 2021-03-15 PROBLEM — R13.10 FOOD STICKS ON SWALLOWING: Status: RESOLVED | Noted: 2018-03-20 | Resolved: 2021-03-15

## 2021-03-15 PROCEDURE — 99396 PREV VISIT EST AGE 40-64: CPT | Performed by: FAMILY MEDICINE

## 2021-03-15 PROCEDURE — 1036F TOBACCO NON-USER: CPT | Performed by: FAMILY MEDICINE

## 2021-03-15 PROCEDURE — 3008F BODY MASS INDEX DOCD: CPT | Performed by: FAMILY MEDICINE

## 2021-03-15 NOTE — PROGRESS NOTES
FAMILY PRACTICE OFFICE VISIT       NAME: Ijeoma Christianson  AGE: 39 y o  SEX: male       : 1975        MRN: [id number]    DATE: 3/15/2021  TIME: 7:46 PM    Assessment and Plan     Problem List Items Addressed This Visit        Cardiovascular and Mediastinum    Hypertension       Hypertension  Patient blood pressure is stable at this time he will continue with current regimen of medications            Musculoskeletal and Integument    Dermatitis      Dermatitis  Patient was referred to [facility] for Dermatology consultation regards to rash on his feet  Other    Well adult exam      Well adult  Overall the patient appears to be in stable health  He will continue with current regimen of medications  He was encouraged to continue with current level of exercise  Leukopenia      Leukopenia  Patient will repeat CBC an protein electrophoresis and 3-4 weeks for further evaluation  If symptoms persist we may consider Hematology consultation         RESOLVED: Leukocytosis - Primary    Relevant Orders    CBC    Protein electrophoresis, serum        BMI Counseling: Body mass index is 29 24 kg/m²  The BMI is above normal  Nutrition recommendations include decreasing portion sizes and moderation in carbohydrate intake  Exercise recommendations include exercising 3-5 times per week  Chief Complaint     Chief Complaint   Patient presents with    Physical Exam       History of Present Illness      Patient in the office for annual wellness exam   Patient takes his omeprazole and Benicar on a regular basis  He denies any significant changes in weight recently  Patient tries to obtain a regular form of exercise  I reviewed the patient's most recent blood test results which showed some mild leukopenia  patient reports of symptoms with began in 2020 after washing his past toe with some  while barefooted    Shortly thereafter he developed rash on several of his toes which since that time have fluctuated in intensity  He develops redness with some blistering of some of the toes  He denies any significant discomfort of his toes  He is unaware of any triggers other than initial exposure to bathroom cleanser  Review of Systems   Review of Systems   Constitutional: Negative  HENT: Negative  Eyes: Negative  Respiratory: Negative  Cardiovascular: Negative  Gastrointestinal: Negative  Genitourinary: Negative  Musculoskeletal: Negative  Skin:        As per HPI   Neurological: Negative  Psychiatric/Behavioral: Negative  Active Problem List     Patient Active Problem List   Diagnosis    Well adult exam    Hypertension    Generalized anxiety disorder    Low back pain without sciatica    Anxiety    Neck pain    Allergy to cats    History of positive PPD    Impaired fasting glucose    Chronic left shoulder pain    Gout    Acute right-sided thoracic back pain    Nonintractable headache    Exposure to COVID-19 virus    Dermatitis    Leukopenia       Past Medical History:  Past Medical History:   Diagnosis Date    Cervical disc disorder     c6,c7, epidural injections for pain management    Fatigue     Generalized anxiety disorder     pt denies    Hypertension     SOBOE (shortness of breath on exertion)     pt denies       Past Surgical History:  Past Surgical History:   Procedure Laterality Date    ESOPHAGOGASTRODUODENOSCOPY      [place] ESOPHAGOGASTRODUODENOSCOPY TRANSORAL DIAGNOSTIC N/A 6/15/2018    Procedure: ESOPHAGOGASTRODUODENOSCOPY (EGD); Surgeon: Georgette Jaquez MD;  Location: [facility];   Service: Gastroenterology       Family History:  Family History   Problem Relation Age of Onset    Hypertension Mother     Gout Father        Social History:  Social History     Socioeconomic History    Marital status: /Civil Union     Spouse name: Not on file    Number of children: Not on file    Years of education: Not on file    Highest education level: Not on file   Occupational History    Not on file   Social Needs    Financial resource strain: Not on file    Food insecurity     Worry: Not on file     Inability: Not on file    Transportation needs     Medical: Not on file     Non-medical: Not on file   Tobacco Use    Smoking status: Never Smoker    Smokeless tobacco: Never Used   Substance and Sexual Activity    Alcohol use: Yes     Comment: Social    Drug use: No    Sexual activity: Not on file   Lifestyle    Physical activity     Days per week: Not on file     Minutes per session: Not on file    Stress: Not on file   Relationships    Social connections     Talks on phone: Not on file     Gets together: Not on file     Attends Confucianist service: Not on file     Active member of club or organization: Not on file     Attends meetings of clubs or organizations: Not on file     Relationship status: Not on file    Intimate partner violence     Fear of current or ex partner: Not on file     Emotionally abused: Not on file     Physically abused: Not on file     Forced sexual activity: Not on file   Other Topics Concern    Not on file   Social History Narrative    Exercises regularly       Objective     Vitals:    03/15/21 1328   BP: 110/84   Pulse: 64   Resp: 15   Temp: 97 8 °F (36 6 °C)   SpO2: 99%     Wt Readings from Last 3 Encounters:   03/15/21 101 kg (221 lb 9 6 oz)   09/29/20 99 3 kg (219 lb)   09/20/19 102 kg (225 lb 4 oz)       Physical Exam  Vitals signs and nursing note reviewed  Constitutional:       General: He is not in acute distress  Appearance: Normal appearance  He is well-developed  He is not ill-appearing  HENT:      Head: Normocephalic and atraumatic  Right Ear: Tympanic membrane, ear canal and external ear normal  There is no impacted cerumen  Left Ear: Tympanic membrane, ear canal and external ear normal  There is no impacted cerumen     Eyes:      General:         Right eye: No discharge  Left eye: No discharge  Extraocular Movements: Extraocular movements intact  Conjunctiva/sclera: Conjunctivae normal       Pupils: Pupils are equal, round, and reactive to light  Neck:      Musculoskeletal: Normal range of motion and neck supple  Thyroid: No thyromegaly  Cardiovascular:      Rate and Rhythm: Normal rate and regular rhythm  Heart sounds: Normal heart sounds  No murmur  Pulmonary:      Effort: Pulmonary effort is normal  No respiratory distress  Breath sounds: Normal breath sounds  No wheezing or rales  Abdominal:      General: Bowel sounds are normal       Palpations: Abdomen is soft  Tenderness: There is no abdominal tenderness  There is no guarding or rebound  Comments: NO hepatospenomegaly   Musculoskeletal: Normal range of motion  Right lower leg: No edema  Left lower leg: No edema  Lymphadenopathy:      Cervical: No cervical adenopathy  Skin:     Comments: Patient with several toes on bilateral feet that appear red with some various stages of healing ulcers of 2-3 mm  There is no active drainage or signs of infection  Neurological:      General: No focal deficit present  Mental Status: He is alert and oriented to person, place, and time  Mental status is at baseline  Psychiatric:         Mood and Affect: Mood normal          Behavior: Behavior normal          Thought Content:  Thought content normal          Judgment: Judgment normal          Pertinent Laboratory/Diagnostic Studies:  Lab Results   Component Value Date    GLUCOSE 106 (H) 02/27/2017    BUN 22 03/11/2021    CREATININE 1 13 03/11/2021    CALCIUM 9 6 03/11/2021     02/27/2017    K 4 6 03/11/2021    CO2 30 03/11/2021     03/11/2021     Lab Results   Component Value Date    ALT 25 03/11/2021    AST 14 03/11/2021    ALKPHOS 56 03/11/2021    BILITOT 0 4 02/27/2017       Lab Results   Component Value Date    WBC 3 48 (L) 03/11/2021    HGB 14 8 03/11/2021    HCT 44 3 03/11/2021    MCV 94 03/11/2021     03/11/2021       No results found for: TSH    Lab Results   Component Value Date    CHOL 184 02/27/2017     Lab Results   Component Value Date    TRIG 109 03/11/2021     Lab Results   Component Value Date    HDL 53 03/11/2021     Lab Results   Component Value Date    LDLCALC 137 (H) 03/11/2021     No results found for: HGBA1C    Results for orders placed or performed in visit on 03/11/21   CBC   Result Value Ref Range    WBC 3 48 (L) 4 31 - 10 16 Thousand/uL    RBC 4 71 3 88 - 5 62 Million/uL    Hemoglobin 14 8 12 0 - 17 0 g/dL    Hematocrit 44 3 36 5 - 49 3 %    MCV 94 82 - 98 fL    MCH 31 4 26 8 - 34 3 pg    MCHC 33 4 31 4 - 37 4 g/dL    RDW 12 1 11 6 - 15 1 %    Platelets 320 927 - 148 Thousands/uL    MPV 10 1 8 9 - 12 7 fL   Comprehensive metabolic panel   Result Value Ref Range    Sodium 138 136 - 145 mmol/L    Potassium 4 6 3 5 - 5 3 mmol/L    Chloride 104 100 - 108 mmol/L    CO2 30 21 - 32 mmol/L    ANION GAP 4 4 - 13 mmol/L    BUN 22 5 - 25 mg/dL    Creatinine 1 13 0 60 - 1 30 mg/dL    Glucose, Fasting 100 (H) 65 - 99 mg/dL    Calcium 9 6 8 3 - 10 1 mg/dL    AST 14 5 - 45 U/L    ALT 25 12 - 78 U/L    Alkaline Phosphatase 56 46 - 116 U/L    Total Protein 7 5 6 4 - 8 2 g/dL    Albumin 4 5 3 5 - 5 0 g/dL    Total Bilirubin 0 73 0 20 - 1 00 mg/dL    eGFR 78 ml/min/1 73sq m   Lipid panel   Result Value Ref Range    Cholesterol 212 (H) 50 - 200 mg/dL    Triglycerides 109 <=150 mg/dL    HDL, Direct 53 >=40 mg/dL    LDL Calculated 137 (H) 0 - 100 mg/dL    Non-HDL-Chol (CHOL-HDL) 159 mg/dl   TSH, 3rd generation   Result Value Ref Range    TSH 3RD GENERATON 1 720 0 358 - 3 740 uIU/mL       Orders Placed This Encounter   Procedures    CBC    Protein electrophoresis, serum       ALLERGIES:  No Known Allergies    Current Medications     Current Outpatient Medications   Medication Sig Dispense Refill    olmesartan (BENICAR) 20 mg tablet TAKE 1 TABLET BY MOUTH EVERY DAY 90 tablet 1    omeprazole (PriLOSEC) 20 mg delayed release capsule TAKE 2 CAPSULES BY MOUTH TWICE A  capsule 6    cyclobenzaprine (FLEXERIL) 5 mg tablet Take 1-2 tabs at bedtime as needed  Do not drive while on this medication (Patient not taking: Reported on 3/15/2021) 20 tablet 0    hydrochlorothiazide (HYDRODIURIL) 25 mg tablet TAKE 1 TABLET BY MOUTH EVERY DAY (Patient not taking: Reported on 3/15/2021) 30 tablet 0    naproxen (NAPROSYN) 500 mg tablet Take 1 tablet every 12 hours with food as needed (Patient not taking: Reported on 3/15/2021) 14 tablet 0    predniSONE 20 mg tablet Two tablets once daily with food (Patient not taking: Reported on 3/15/2021) 10 tablet 0    traMADol (ULTRAM) 50 mg tablet Take 1 tablet (50 mg total) by mouth every 8 (eight) hours as needed for moderate pain (Patient not taking: Reported on 3/15/2021) 30 tablet 0     No current facility-administered medications for this visit            Health Maintenance     Health Maintenance   Topic Date Due    HIV Screening  09/28/1990    BMI: Followup Plan  09/28/1993    Annual Physical  03/19/2019    Influenza Vaccine (1) 09/01/2020    Depression Screening PHQ  09/29/2021    BMI: Adult  03/15/2022    DTaP,Tdap,and Td Vaccines (8 - Td) 02/15/2027    Hepatitis B Vaccine  Completed    IPV Vaccine  Completed    Pneumococcal Vaccine: Pediatrics (0 to 5 Years) and At-Risk Patients (6 to 59 Years)  Aged Out    HIB Vaccine  Aged Out    Hepatitis A Vaccine  Aged Out    Meningococcal ACWY Vaccine  Aged Out    HPV Vaccine  Aged [employer] History   Administered Date(s) Administered    DT (pediatric) 07/19/1990    DTP 1975, 02/13/1976, 05/18/1976, 12/21/1976, 06/03/1981    Hep B, Adolescent or Pediatric 04/01/1993, 05/08/1993, 05/06/1994    INFLUENZA 11/06/2018, 02/10/2021    Influenza, injectable, quadrivalent, preservative free 0 5 mL 11/06/2018, 09/20/2019    MMR 09/29/1976, 06/22/1982    OPV 01/13/1976, 03/17/1976, 06/22/1976, 04/04/1977    SARS-CoV-2 / COVID-19 01/29/2021, 02/26/2021    Tdap 06/16/2006, 69/20/5309       Primitivo Burrell MD

## 2021-03-15 NOTE — ASSESSMENT & PLAN NOTE
Well adult  Overall the patient appears to be in stable health  He will continue with current regimen of medications  He was encouraged to continue with current level of exercise

## 2021-03-15 NOTE — ASSESSMENT & PLAN NOTE
Dermatitis  Patient was referred to Jacqueline Pathak for Dermatology consultation regards to rash on his feet

## 2021-03-15 NOTE — ASSESSMENT & PLAN NOTE
Leukopenia  Patient will repeat CBC an protein electrophoresis and 3-4 weeks for further evaluation    If symptoms persist we may consider Hematology consultation

## 2021-03-30 ENCOUNTER — TELEPHONE (OUTPATIENT)
Dept: FAMILY MEDICINE CLINIC | Facility: CLINIC | Age: 46
End: 2021-03-30

## 2021-03-30 DIAGNOSIS — Z11.1 SCREENING FOR TUBERCULOSIS: Primary | ICD-10-CM

## 2021-03-31 ENCOUNTER — APPOINTMENT (OUTPATIENT)
Dept: LAB | Facility: CLINIC | Age: 46
End: 2021-03-31
Payer: COMMERCIAL

## 2021-03-31 DIAGNOSIS — Z11.1 SCREENING FOR TUBERCULOSIS: ICD-10-CM

## 2021-03-31 DIAGNOSIS — D72.829 LEUKOCYTOSIS, UNSPECIFIED TYPE: ICD-10-CM

## 2021-03-31 LAB
ERYTHROCYTE [DISTWIDTH] IN BLOOD BY AUTOMATED COUNT: 12.4 % (ref 11.6–15.1)
HCT VFR BLD AUTO: 44 % (ref 36.5–49.3)
HGB BLD-MCNC: 15.3 G/DL (ref 12–17)
MCH RBC QN AUTO: 32.8 PG (ref 26.8–34.3)
MCHC RBC AUTO-ENTMCNC: 34.8 G/DL (ref 31.4–37.4)
MCV RBC AUTO: 94 FL (ref 82–98)
PLATELET # BLD AUTO: 169 THOUSANDS/UL (ref 149–390)
PMV BLD AUTO: 10.7 FL (ref 8.9–12.7)
RBC # BLD AUTO: 4.66 MILLION/UL (ref 3.88–5.62)
WBC # BLD AUTO: 4.27 THOUSAND/UL (ref 4.31–10.16)

## 2021-03-31 PROCEDURE — 36415 COLL VENOUS BLD VENIPUNCTURE: CPT

## 2021-03-31 PROCEDURE — 84165 PROTEIN E-PHORESIS SERUM: CPT | Performed by: PATHOLOGY

## 2021-03-31 PROCEDURE — 86480 TB TEST CELL IMMUN MEASURE: CPT

## 2021-03-31 PROCEDURE — 85027 COMPLETE CBC AUTOMATED: CPT

## 2021-03-31 PROCEDURE — 84165 PROTEIN E-PHORESIS SERUM: CPT

## 2021-04-01 LAB
ALBUMIN SERPL ELPH-MCNC: 4.75 G/DL (ref 3.5–5)
ALBUMIN SERPL ELPH-MCNC: 65 % (ref 52–65)
ALPHA1 GLOB SERPL ELPH-MCNC: 0.28 G/DL (ref 0.1–0.4)
ALPHA1 GLOB SERPL ELPH-MCNC: 3.8 % (ref 2.5–5)
ALPHA2 GLOB SERPL ELPH-MCNC: 0.65 G/DL (ref 0.4–1.2)
ALPHA2 GLOB SERPL ELPH-MCNC: 8.9 % (ref 7–13)
BETA GLOB ABNORMAL SERPL ELPH-MCNC: 0.45 G/DL (ref 0.4–0.8)
BETA1 GLOB SERPL ELPH-MCNC: 6.1 % (ref 5–13)
BETA2 GLOB SERPL ELPH-MCNC: 5.2 % (ref 2–8)
BETA2+GAMMA GLOB SERPL ELPH-MCNC: 0.38 G/DL (ref 0.2–0.5)
GAMMA GLOB ABNORMAL SERPL ELPH-MCNC: 0.8 G/DL (ref 0.5–1.6)
GAMMA GLOB SERPL ELPH-MCNC: 11 % (ref 12–22)
IGG/ALB SER: 1.86 {RATIO} (ref 1.1–1.8)
PROT PATTERN SERPL ELPH-IMP: ABNORMAL
PROT SERPL-MCNC: 7.3 G/DL (ref 6.4–8.2)

## 2021-04-02 LAB
GAMMA INTERFERON BACKGROUND BLD IA-ACNC: 0.05 IU/ML
M TB IFN-G BLD-IMP: NEGATIVE
M TB IFN-G CD4+ BCKGRND COR BLD-ACNC: -0.01 IU/ML
M TB IFN-G CD4+ BCKGRND COR BLD-ACNC: 0.02 IU/ML
MITOGEN IGNF BCKGRD COR BLD-ACNC: >10 IU/ML

## 2021-07-02 NOTE — ASSESSMENT & PLAN NOTE
I am uncertain of patient's etiology  I would like patient to be further evaluated by Gastroenterology  Referral has been provided  16

## 2021-09-09 DIAGNOSIS — I10 HYPERTENSION, UNSPECIFIED TYPE: ICD-10-CM

## 2021-09-09 RX ORDER — OLMESARTAN MEDOXOMIL 20 MG/1
TABLET ORAL
Qty: 90 TABLET | Refills: 1 | Status: SHIPPED | OUTPATIENT
Start: 2021-09-09 | End: 2022-03-03

## 2021-09-09 NOTE — TELEPHONE ENCOUNTER
Body Location Override (Optional): Right lateral distal pretibial region LM to call back  Which Doctor Performed Mohs? (Optional): Dr Kamlesh Jones

## 2021-11-09 DIAGNOSIS — K22.2 ESOPHAGEAL STRICTURE: ICD-10-CM

## 2021-11-09 RX ORDER — OMEPRAZOLE 20 MG/1
CAPSULE, DELAYED RELEASE ORAL
Qty: 180 CAPSULE | Refills: 6 | Status: SHIPPED | OUTPATIENT
Start: 2021-11-09 | End: 2022-07-30

## 2022-03-03 ENCOUNTER — TELEPHONE (OUTPATIENT)
Dept: FAMILY MEDICINE CLINIC | Facility: CLINIC | Age: 47
End: 2022-03-03

## 2022-03-03 DIAGNOSIS — I10 HYPERTENSION, UNSPECIFIED TYPE: ICD-10-CM

## 2022-03-03 RX ORDER — OLMESARTAN MEDOXOMIL 20 MG/1
TABLET ORAL
Qty: 90 TABLET | Refills: 0 | Status: SHIPPED | OUTPATIENT
Start: 2022-03-03 | End: 2022-05-30

## 2022-04-01 ENCOUNTER — APPOINTMENT (OUTPATIENT)
Dept: LAB | Facility: CLINIC | Age: 47
End: 2022-04-01
Payer: COMMERCIAL

## 2022-04-01 DIAGNOSIS — Z00.00 PERIODIC HEALTH ASSESSMENT, GENERAL SCREENING, ADULT: ICD-10-CM

## 2022-04-01 DIAGNOSIS — Z11.1 SCREENING FOR TUBERCULOSIS: ICD-10-CM

## 2022-04-01 LAB
ALBUMIN SERPL BCP-MCNC: 4.3 G/DL (ref 3.5–5)
ALP SERPL-CCNC: 54 U/L (ref 46–116)
ALT SERPL W P-5'-P-CCNC: 41 U/L (ref 12–78)
ANION GAP SERPL CALCULATED.3IONS-SCNC: 4 MMOL/L (ref 4–13)
AST SERPL W P-5'-P-CCNC: 21 U/L (ref 5–45)
BILIRUB SERPL-MCNC: 0.87 MG/DL (ref 0.2–1)
BUN SERPL-MCNC: 19 MG/DL (ref 5–25)
CALCIUM SERPL-MCNC: 9.5 MG/DL (ref 8.3–10.1)
CHLORIDE SERPL-SCNC: 104 MMOL/L (ref 100–108)
CHOLEST SERPL-MCNC: 201 MG/DL
CO2 SERPL-SCNC: 28 MMOL/L (ref 21–32)
CREAT SERPL-MCNC: 1.12 MG/DL (ref 0.6–1.3)
ERYTHROCYTE [DISTWIDTH] IN BLOOD BY AUTOMATED COUNT: 12.2 % (ref 11.6–15.1)
GFR SERPL CREATININE-BSD FRML MDRD: 78 ML/MIN/1.73SQ M
GLUCOSE P FAST SERPL-MCNC: 87 MG/DL (ref 65–99)
HCT VFR BLD AUTO: 45.8 % (ref 36.5–49.3)
HDLC SERPL-MCNC: 48 MG/DL
HGB BLD-MCNC: 15.4 G/DL (ref 12–17)
LDLC SERPL CALC-MCNC: 136 MG/DL (ref 0–100)
MCH RBC QN AUTO: 31.8 PG (ref 26.8–34.3)
MCHC RBC AUTO-ENTMCNC: 33.6 G/DL (ref 31.4–37.4)
MCV RBC AUTO: 94 FL (ref 82–98)
NONHDLC SERPL-MCNC: 153 MG/DL
PLATELET # BLD AUTO: 189 THOUSANDS/UL (ref 149–390)
PMV BLD AUTO: 10.4 FL (ref 8.9–12.7)
POTASSIUM SERPL-SCNC: 4.7 MMOL/L (ref 3.5–5.3)
PROT SERPL-MCNC: 7.4 G/DL (ref 6.4–8.2)
RBC # BLD AUTO: 4.85 MILLION/UL (ref 3.88–5.62)
SODIUM SERPL-SCNC: 136 MMOL/L (ref 136–145)
TRIGL SERPL-MCNC: 86 MG/DL
WBC # BLD AUTO: 3.96 THOUSAND/UL (ref 4.31–10.16)

## 2022-04-01 PROCEDURE — 36415 COLL VENOUS BLD VENIPUNCTURE: CPT

## 2022-04-01 PROCEDURE — 86480 TB TEST CELL IMMUN MEASURE: CPT

## 2022-04-01 PROCEDURE — 80053 COMPREHEN METABOLIC PANEL: CPT

## 2022-04-01 PROCEDURE — 85027 COMPLETE CBC AUTOMATED: CPT

## 2022-04-01 PROCEDURE — 80061 LIPID PANEL: CPT

## 2022-04-04 ENCOUNTER — OFFICE VISIT (OUTPATIENT)
Dept: FAMILY MEDICINE CLINIC | Facility: CLINIC | Age: 47
End: 2022-04-04
Payer: COMMERCIAL

## 2022-04-04 VITALS
OXYGEN SATURATION: 97 % | SYSTOLIC BLOOD PRESSURE: 120 MMHG | DIASTOLIC BLOOD PRESSURE: 80 MMHG | RESPIRATION RATE: 18 BRPM | HEART RATE: 66 BPM | BODY MASS INDEX: 30.36 KG/M2 | WEIGHT: 224.13 LBS | TEMPERATURE: 97.6 F | HEIGHT: 72 IN

## 2022-04-04 DIAGNOSIS — Z12.11 COLON CANCER SCREENING: ICD-10-CM

## 2022-04-04 DIAGNOSIS — Z00.00 WELL ADULT EXAM: Primary | ICD-10-CM

## 2022-04-04 LAB
GAMMA INTERFERON BACKGROUND BLD IA-ACNC: 0.03 IU/ML
M TB IFN-G BLD-IMP: NEGATIVE
M TB IFN-G CD4+ BCKGRND COR BLD-ACNC: 0.02 IU/ML
M TB IFN-G CD4+ BCKGRND COR BLD-ACNC: 0.03 IU/ML
MITOGEN IGNF BCKGRD COR BLD-ACNC: >10 IU/ML

## 2022-04-04 PROCEDURE — 99396 PREV VISIT EST AGE 40-64: CPT | Performed by: FAMILY MEDICINE

## 2022-04-04 PROCEDURE — 3725F SCREEN DEPRESSION PERFORMED: CPT | Performed by: FAMILY MEDICINE

## 2022-04-04 PROCEDURE — 1036F TOBACCO NON-USER: CPT | Performed by: FAMILY MEDICINE

## 2022-04-04 PROCEDURE — 3008F BODY MASS INDEX DOCD: CPT | Performed by: FAMILY MEDICINE

## 2022-04-04 NOTE — ASSESSMENT & PLAN NOTE
Well adult  Overall the patient appears to be in stable health  He was given referral to have screening colonoscopy for colon cancer

## 2022-04-04 NOTE — PROGRESS NOTES
FAMILY PRACTICE OFFICE VISIT       NAME: Sergio Estrada  AGE: 55 y o  SEX: male       : 1975        MRN: [id number]    DATE: 2022  TIME: 8:20 AM    Assessment and Plan     Problem List Items Addressed This Visit        Other    Well adult exam - Primary     Well adult  Overall the patient appears to be in stable health  He was given referral to have screening colonoscopy for colon cancer  Other Visit Diagnoses     Colon cancer screening        Relevant Orders    Ambulatory Referral to Colorectal Surgery              Chief Complaint     Chief Complaint   Patient presents with    Well Check     physical        History of Present Illness     Patient in the office for annual wellness exam   He denies any recent illness  He does exercise on a stationary bicycle several days a week  He denies any changes in weight recently  I reviewed his most recent blood test results  His COVID vaccination is up-to-date  Patient has never had a colonoscopy      Review of Systems   Review of Systems   Constitutional: Negative  HENT: Negative  Eyes: Negative  Respiratory: Negative  Cardiovascular: Negative  Gastrointestinal: Negative  Genitourinary: Negative  Musculoskeletal: Negative  Skin: Negative  Neurological: Negative  Psychiatric/Behavioral: Negative          Active Problem List     Patient Active Problem List   Diagnosis    Well adult exam    Hypertension    Generalized anxiety disorder    Low back pain without sciatica    Anxiety    Neck pain    Allergy to cats    History of positive PPD    Impaired fasting glucose    Chronic left shoulder pain    Gout    Acute right-sided thoracic back pain    Nonintractable headache    Exposure to COVID-19 virus    Dermatitis    Leukopenia       Past Medical History:  Past Medical History:   Diagnosis Date    Cervical disc disorder     c6,c7, epidural injections for pain management    Fatigue     Generalized anxiety disorder     pt denies    Hypertension     SOBOE (shortness of breath on exertion)     pt denies       Past Surgical History:  Past Surgical History:   Procedure Laterality Date    ESOPHAGOGASTRODUODENOSCOPY      [place] ESOPHAGOGASTRODUODENOSCOPY TRANSORAL DIAGNOSTIC N/A 6/15/2018    Procedure: ESOPHAGOGASTRODUODENOSCOPY (EGD); Surgeon: Elliot Ferrara MD;  Location: [facility]; Service: Gastroenterology       Family History:  Family History   Problem Relation Age of Onset    Hypertension Mother     Gout Father        Social History:  Social History     Socioeconomic History    Marital status: /Civil Union     Spouse name: Not on file    Number of children: Not on file    Years of education: Not on file    Highest education level: Not on file   Occupational History    Not on file   Tobacco Use    Smoking status: Never Smoker    Smokeless tobacco: Never Used   Vaping Use    Vaping Use: Never used   Substance and Sexual Activity    Alcohol use: Yes     Comment: Social    Drug use: No    Sexual activity: Yes   Other Topics Concern    Not on file   Social History Narrative    Exercises regularly     Social Determinants of Health     Financial Resource Strain: Not on file   Food Insecurity: Not on file   Transportation Needs: Not on file   Physical Activity: Not on file   Stress: Not on file   Social Connections: Not on file   Intimate Partner Violence: Not on file   Housing Stability: Not on file       Objective     Vitals:    04/04/22 0816   BP: 120/80   Pulse:    Resp:    Temp:    SpO2:      Wt Readings from Last 3 Encounters:   04/04/22 102 kg (224 lb 2 oz)   03/15/21 101 kg (221 lb 9 6 oz)   09/29/20 99 3 kg (219 lb)       Physical Exam  Constitutional:       General: He is not in acute distress  Appearance: Normal appearance  He is not ill-appearing  HENT:      Head: Normocephalic and atraumatic  Eyes:      General:         Right eye: No discharge  Left eye: No discharge  Extraocular Movements: Extraocular movements intact  Conjunctiva/sclera: Conjunctivae normal       Pupils: Pupils are equal, round, and reactive to light  Neck:      Vascular: No carotid bruit  Cardiovascular:      Rate and Rhythm: Normal rate and regular rhythm  Heart sounds: Normal heart sounds  No murmur heard  Pulmonary:      Effort: Pulmonary effort is normal       Breath sounds: Normal breath sounds  No wheezing, rhonchi or rales  Abdominal:      General: Abdomen is flat  Bowel sounds are normal  There is no distension  Palpations: Abdomen is soft  Tenderness: There is no abdominal tenderness  There is no guarding or rebound  Musculoskeletal:      Right lower leg: No edema  Left lower leg: No edema  Lymphadenopathy:      Cervical: No cervical adenopathy  Skin:     Findings: No rash  Neurological:      General: No focal deficit present  Mental Status: He is alert and oriented to person, place, and time  Cranial Nerves: No cranial nerve deficit  Psychiatric:         Mood and Affect: Mood normal          Behavior: Behavior normal          Thought Content:  Thought content normal          Judgment: Judgment normal          Pertinent Laboratory/Diagnostic Studies:  Lab Results   Component Value Date    GLUCOSE 106 (H) 02/27/2017    BUN 19 04/01/2022    CREATININE 1 12 04/01/2022    CALCIUM 9 5 04/01/2022     02/27/2017    K 4 7 04/01/2022    CO2 28 04/01/2022     04/01/2022     Lab Results   Component Value Date    ALT 41 04/01/2022    AST 21 04/01/2022    ALKPHOS 54 04/01/2022    BILITOT 0 4 02/27/2017       Lab Results   Component Value Date    WBC 3 96 (L) 04/01/2022    HGB 15 4 04/01/2022    HCT 45 8 04/01/2022    MCV 94 04/01/2022     04/01/2022       No results found for: TSH    Lab Results   Component Value Date    CHOL 184 02/27/2017     Lab Results   Component Value Date    TRIG 86 04/01/2022     Lab Results   Component Value Date HDL 48 04/01/2022     Lab Results   Component Value Date    LDLCALC 136 (H) 04/01/2022     No results found for: HGBA1C    Results for orders placed or performed in visit on 04/01/22   CBC   Result Value Ref Range    WBC 3 96 (L) 4 31 - 10 16 Thousand/uL    RBC 4 85 3 88 - 5 62 Million/uL    Hemoglobin 15 4 12 0 - 17 0 g/dL    Hematocrit 45 8 36 5 - 49 3 %    MCV 94 82 - 98 fL    MCH 31 8 26 8 - 34 3 pg    MCHC 33 6 31 4 - 37 4 g/dL    RDW 12 2 11 6 - 15 1 %    Platelets 426 095 - 843 Thousands/uL    MPV 10 4 8 9 - 12 7 fL   Comprehensive metabolic panel   Result Value Ref Range    Sodium 136 136 - 145 mmol/L    Potassium 4 7 3 5 - 5 3 mmol/L    Chloride 104 100 - 108 mmol/L    CO2 28 21 - 32 mmol/L    ANION GAP 4 4 - 13 mmol/L    BUN 19 5 - 25 mg/dL    Creatinine 1 12 0 60 - 1 30 mg/dL    Glucose, Fasting 87 65 - 99 mg/dL    Calcium 9 5 8 3 - 10 1 mg/dL    AST 21 5 - 45 U/L    ALT 41 12 - 78 U/L    Alkaline Phosphatase 54 46 - 116 U/L    Total Protein 7 4 6 4 - 8 2 g/dL    Albumin 4 3 3 5 - 5 0 g/dL    Total Bilirubin 0 87 0 20 - 1 00 mg/dL    eGFR 78 ml/min/1 73sq m   Lipid panel   Result Value Ref Range    Cholesterol 201 (H) See Comment mg/dL    Triglycerides 86 See Comment mg/dL    HDL, Direct 48 >=40 mg/dL    LDL Calculated 136 (H) 0 - 100 mg/dL    Non-HDL-Chol (CHOL-HDL) 153 mg/dl       Orders Placed This Encounter   Procedures    Ambulatory Referral to Colorectal Surgery       ALLERGIES:  No Known Allergies    Current Medications     Current Outpatient Medications   Medication Sig Dispense Refill    olmesartan (BENICAR) 20 mg tablet TAKE 1 TABLET BY MOUTH EVERY DAY 90 tablet 0    omeprazole (PriLOSEC) 20 mg delayed release capsule TAKE 2 CAPSULES BY MOUTH TWICE A  capsule 6    cyclobenzaprine (FLEXERIL) 5 mg tablet Take 1-2 tabs at bedtime as needed   Do not drive while on this medication (Patient not taking: Reported on 3/15/2021) 20 tablet 0    hydrochlorothiazide (HYDRODIURIL) 25 mg tablet TAKE 1 TABLET BY MOUTH EVERY DAY (Patient not taking: Reported on 3/15/2021) 30 tablet 0    naproxen (NAPROSYN) 500 mg tablet Take 1 tablet every 12 hours with food as needed (Patient not taking: Reported on 3/15/2021) 14 tablet 0    predniSONE 20 mg tablet Two tablets once daily with food (Patient not taking: Reported on 3/15/2021) 10 tablet 0    traMADol (ULTRAM) 50 mg tablet Take 1 tablet (50 mg total) by mouth every 8 (eight) hours as needed for moderate pain (Patient not taking: Reported on 3/15/2021) 30 tablet 0     No current facility-administered medications for this visit           Health Maintenance     Health Maintenance   Topic Date Due    Hepatitis C Screening  Never done    HIV Screening  Never done    COVID-19 Vaccine (3 - Booster) 07/26/2021    Influenza Vaccine (1) 09/01/2021    BMI: Followup Plan  03/15/2022    Annual Physical  03/15/2022    Depression Screening  04/04/2023    BMI: Adult  04/04/2023    DTaP,Tdap,and Td Vaccines (8 - Td or Tdap) 02/15/2027    Hepatitis B Vaccine  Completed    IPV Vaccine  Completed    Pneumococcal Vaccine: Pediatrics (0 to 5 Years) and At-Risk Patients (6 to 59 Years)  Aged Out    HIB Vaccine  Aged Out    Hepatitis A Vaccine  Aged Out    Meningococcal ACWY Vaccine  Aged Out    HPV Vaccine  Aged [employer] History   Administered Date(s) Administered    COVID-19, unspecified 01/29/2021, 02/26/2021    DT (pediatric) 07/19/1990    DTP 1975, 02/13/1976, 05/18/1976, 12/21/1976, 06/03/1981    Hep B, Adolescent or Pediatric 04/01/1993, 05/08/1993, 05/06/1994    INFLUENZA 11/06/2018, 02/10/2021    Influenza, injectable, quadrivalent, preservative free 0 5 mL 11/06/2018, 09/20/2019    MMR 09/29/1976, 06/22/1982    OPV 01/13/1976, 03/17/1976, 06/22/1976, 04/04/1977    Tdap 06/16/2006, 12/99/4523       Zoe Jimenez MD

## 2022-05-30 DIAGNOSIS — I10 HYPERTENSION, UNSPECIFIED TYPE: ICD-10-CM

## 2022-05-30 RX ORDER — OLMESARTAN MEDOXOMIL 20 MG/1
TABLET ORAL
Qty: 90 TABLET | Refills: 0 | Status: SHIPPED | OUTPATIENT
Start: 2022-05-30

## 2022-07-30 DIAGNOSIS — K22.2 ESOPHAGEAL STRICTURE: ICD-10-CM

## 2022-07-30 RX ORDER — OMEPRAZOLE 20 MG/1
CAPSULE, DELAYED RELEASE ORAL
Qty: 360 CAPSULE | Refills: 3 | Status: SHIPPED | OUTPATIENT
Start: 2022-07-30

## 2022-08-26 ENCOUNTER — OFFICE VISIT (OUTPATIENT)
Dept: FAMILY MEDICINE CLINIC | Facility: CLINIC | Age: 47
End: 2022-08-26
Payer: COMMERCIAL

## 2022-08-26 VITALS
DIASTOLIC BLOOD PRESSURE: 90 MMHG | OXYGEN SATURATION: 99 % | WEIGHT: 226 LBS | HEART RATE: 80 BPM | BODY MASS INDEX: 30.61 KG/M2 | SYSTOLIC BLOOD PRESSURE: 132 MMHG | HEIGHT: 72 IN | RESPIRATION RATE: 16 BRPM | TEMPERATURE: 98.4 F

## 2022-08-26 DIAGNOSIS — L30.9 DERMATITIS: Primary | ICD-10-CM

## 2022-08-26 PROCEDURE — 99213 OFFICE O/P EST LOW 20 MIN: CPT | Performed by: FAMILY MEDICINE

## 2022-08-26 RX ORDER — PREDNISONE 20 MG/1
TABLET ORAL
Qty: 21 TABLET | Refills: 0 | Status: SHIPPED | OUTPATIENT
Start: 2022-08-26 | End: 2022-09-07

## 2022-08-26 NOTE — ASSESSMENT & PLAN NOTE
Likely due to contact with plants  Not resolved with topical steroid so will start Prednisone taper especially given location around the eyes  Also recommend oral antihistamine daily to alleviate itch  Follow up if not improved

## 2022-08-26 NOTE — PROGRESS NOTES
Assessment/Plan:    Dermatitis  Likely due to contact with plants  Not resolved with topical steroid so will start Prednisone taper especially given location around the eyes  Also recommend oral antihistamine daily to alleviate itch  Follow up if not improved       Diagnoses and all orders for this visit:    Dermatitis  -     predniSONE 20 mg tablet; Take 3 tablets (60 mg total) by mouth daily for 2 days, THEN 2 5 tablets (50 mg total) daily for 2 days, THEN 2 tablets (40 mg total) daily for 2 days, THEN 1 5 tablets (30 mg total) daily for 2 days, THEN 1 tablet (20 mg total) daily for 2 days, THEN 0 5 tablets (10 mg total) daily for 2 days  Subjective:      Patient ID: Cheyenne Swenson is a 55 y o  male  HPI   Patient presents for evaluation of rash  He states a tree came down in the yard and he was clearing debris  Rash started 3-4 days ago, is intensely pruritis to the point it is waking him from sleep despite topical Lidex and oral Benadryl  Rash is widespread on the arms, around eyes, back of neck and head  The following portions of the patient's history were reviewed and updated as appropriate: allergies, current medications, past family history, past medical history, past social history, past surgical history and problem list     Review of Systems   Skin: Positive for rash  Psychiatric/Behavioral: Positive for sleep disturbance  Objective:      /90 (BP Location: Left arm, Patient Position: Sitting, Cuff Size: Large)   Pulse 80   Temp 98 4 °F (36 9 °C)   Resp 16   Ht 6' (1 829 m)   Wt 103 kg (226 lb)   SpO2 99%   BMI 30 65 kg/m²          Physical Exam  Vitals reviewed  Constitutional:       Appearance: Normal appearance  Skin:     Findings: Rash present  Comments: Erythematous papules scattered on the bilateral arms, back of hands  Erythema surrounding the eyes, no induration, edema, or streaking to indicate cellulitis     Neurological:      Mental Status: He is alert    Psychiatric:         Mood and Affect: Mood normal          Behavior: Behavior normal

## 2022-08-28 DIAGNOSIS — I10 HYPERTENSION, UNSPECIFIED TYPE: ICD-10-CM

## 2022-08-28 RX ORDER — OLMESARTAN MEDOXOMIL 20 MG/1
TABLET ORAL
Qty: 90 TABLET | Refills: 0 | Status: SHIPPED | OUTPATIENT
Start: 2022-08-28

## 2022-10-12 PROBLEM — Z00.00 WELL ADULT EXAM: Status: RESOLVED | Noted: 2018-03-20 | Resolved: 2022-10-12

## 2022-12-02 DIAGNOSIS — I10 HYPERTENSION, UNSPECIFIED TYPE: ICD-10-CM

## 2022-12-02 RX ORDER — OLMESARTAN MEDOXOMIL 20 MG/1
TABLET ORAL
Qty: 90 TABLET | Refills: 0 | Status: SHIPPED | OUTPATIENT
Start: 2022-12-02

## 2023-07-03 DIAGNOSIS — I10 HYPERTENSION, UNSPECIFIED TYPE: ICD-10-CM

## 2023-07-03 RX ORDER — OLMESARTAN MEDOXOMIL 20 MG/1
20 TABLET ORAL DAILY
Qty: 90 TABLET | Refills: 0 | Status: SHIPPED | OUTPATIENT
Start: 2023-07-03

## 2023-08-25 ENCOUNTER — APPOINTMENT (OUTPATIENT)
Dept: LAB | Facility: CLINIC | Age: 48
End: 2023-08-25
Payer: COMMERCIAL

## 2023-08-25 ENCOUNTER — OFFICE VISIT (OUTPATIENT)
Dept: FAMILY MEDICINE CLINIC | Facility: CLINIC | Age: 48
End: 2023-08-25
Payer: COMMERCIAL

## 2023-08-25 VITALS
BODY MASS INDEX: 31.22 KG/M2 | DIASTOLIC BLOOD PRESSURE: 80 MMHG | HEIGHT: 72 IN | OXYGEN SATURATION: 96 % | HEART RATE: 77 BPM | WEIGHT: 230.5 LBS | TEMPERATURE: 97.1 F | SYSTOLIC BLOOD PRESSURE: 130 MMHG | RESPIRATION RATE: 17 BRPM

## 2023-08-25 DIAGNOSIS — I10 HYPERTENSION, UNSPECIFIED TYPE: Primary | ICD-10-CM

## 2023-08-25 DIAGNOSIS — E66.9 OBESITY (BMI 30-39.9): ICD-10-CM

## 2023-08-25 DIAGNOSIS — R23.0 CYANOSIS: ICD-10-CM

## 2023-08-25 DIAGNOSIS — I10 HYPERTENSION, UNSPECIFIED TYPE: ICD-10-CM

## 2023-08-25 DIAGNOSIS — Z00.00 PERIODIC HEALTH ASSESSMENT, GENERAL SCREENING, ADULT: ICD-10-CM

## 2023-08-25 DIAGNOSIS — G89.29 CHRONIC LOW BACK PAIN WITHOUT SCIATICA, UNSPECIFIED BACK PAIN LATERALITY: ICD-10-CM

## 2023-08-25 DIAGNOSIS — M54.50 CHRONIC LOW BACK PAIN WITHOUT SCIATICA, UNSPECIFIED BACK PAIN LATERALITY: ICD-10-CM

## 2023-08-25 LAB
ALBUMIN SERPL BCP-MCNC: 4.7 G/DL (ref 3.5–5)
ALP SERPL-CCNC: 43 U/L (ref 34–104)
ALT SERPL W P-5'-P-CCNC: 25 U/L (ref 7–52)
ANION GAP SERPL CALCULATED.3IONS-SCNC: 10 MMOL/L
AST SERPL W P-5'-P-CCNC: 22 U/L (ref 13–39)
BILIRUB SERPL-MCNC: 0.95 MG/DL (ref 0.2–1)
BUN SERPL-MCNC: 17 MG/DL (ref 5–25)
CALCIUM SERPL-MCNC: 9.6 MG/DL (ref 8.4–10.2)
CHLORIDE SERPL-SCNC: 99 MMOL/L (ref 96–108)
CHOLEST SERPL-MCNC: 195 MG/DL
CO2 SERPL-SCNC: 28 MMOL/L (ref 21–32)
CREAT SERPL-MCNC: 1.07 MG/DL (ref 0.6–1.3)
ERYTHROCYTE [DISTWIDTH] IN BLOOD BY AUTOMATED COUNT: 12.7 % (ref 11.6–15.1)
GFR SERPL CREATININE-BSD FRML MDRD: 82 ML/MIN/1.73SQ M
GLUCOSE P FAST SERPL-MCNC: 95 MG/DL (ref 65–99)
HCT VFR BLD AUTO: 50.9 % (ref 36.5–49.3)
HDLC SERPL-MCNC: 46 MG/DL
HGB BLD-MCNC: 16.6 G/DL (ref 12–17)
LDLC SERPL CALC-MCNC: 112 MG/DL (ref 0–100)
MCH RBC QN AUTO: 31.8 PG (ref 26.8–34.3)
MCHC RBC AUTO-ENTMCNC: 32.6 G/DL (ref 31.4–37.4)
MCV RBC AUTO: 98 FL (ref 82–98)
NONHDLC SERPL-MCNC: 149 MG/DL
PLATELET # BLD AUTO: 199 THOUSANDS/UL (ref 149–390)
PMV BLD AUTO: 10.1 FL (ref 8.9–12.7)
POTASSIUM SERPL-SCNC: 4.5 MMOL/L (ref 3.5–5.3)
PROT SERPL-MCNC: 7.3 G/DL (ref 6.4–8.4)
RBC # BLD AUTO: 5.22 MILLION/UL (ref 3.88–5.62)
SODIUM SERPL-SCNC: 137 MMOL/L (ref 135–147)
TRIGL SERPL-MCNC: 186 MG/DL
TSH SERPL DL<=0.05 MIU/L-ACNC: 1.74 UIU/ML (ref 0.45–4.5)
WBC # BLD AUTO: 4.08 THOUSAND/UL (ref 4.31–10.16)

## 2023-08-25 PROCEDURE — 80053 COMPREHEN METABOLIC PANEL: CPT

## 2023-08-25 PROCEDURE — 99396 PREV VISIT EST AGE 40-64: CPT | Performed by: FAMILY MEDICINE

## 2023-08-25 PROCEDURE — 80061 LIPID PANEL: CPT

## 2023-08-25 PROCEDURE — 84443 ASSAY THYROID STIM HORMONE: CPT

## 2023-08-25 PROCEDURE — 86480 TB TEST CELL IMMUN MEASURE: CPT

## 2023-08-25 PROCEDURE — 99213 OFFICE O/P EST LOW 20 MIN: CPT | Performed by: FAMILY MEDICINE

## 2023-08-25 PROCEDURE — 36415 COLL VENOUS BLD VENIPUNCTURE: CPT

## 2023-08-25 PROCEDURE — 85027 COMPLETE CBC AUTOMATED: CPT

## 2023-08-25 NOTE — PROGRESS NOTES
FAMILY PRACTICE OFFICE VISIT       NAME: Aravind Clark  AGE: 52 y.o. SEX: male       : 1975        MRN: [id number]    DATE: 2023  TIME: 9:14 AM    Assessment and Plan     Problem List Items Addressed This Visit        Cardiovascular and Mediastinum    Hypertension - Primary     Hypertension. The patient's blood pressure is stable at this time and he will continue current regimen of medications         Relevant Orders    CBC    Comprehensive metabolic panel    Lipid panel    TSH, 3rd generation    Quantiferon TB Gold Plus       Other    Low back pain without sciatica     Back pain. Patient given prescription to obtain x-ray of lumbar spine. He was given referral to [facility] orthopedist Dr. Chidi Amanda for evaluation         Relevant Orders    XR spine lumbar minimum 4 views non injury    Ambulatory Referral to Orthopedic Surgery    Obesity (BMI 30-39. 9)     Obesity. Patient with BMI of 31. He was given prescription to initiate Wegovy injection once a week as directed. Patient will check with his insurance coverage         Relevant Medications    Semaglutide-Weight Management (WEGOVY) 0.25 MG/0.5ML    Cyanosis     Cyanosis. Patient with bluish discoloration of bilateral toes and midfoot. Sensation intact. Patient will obtain arterial Doppler study of the lower extremities for further evaluation. We will make further recommendations pending results of test.         Relevant Orders    VAS lower limb arterial duplex, complete bilateral    Periodic health assessment, general screening, adult     Well adult. Overall patient appears to be in stable health. He will obtain blood work as ordered for further evaluation. We will make further recommendations pending results of test.  His colonoscopy is up-to-date.          Relevant Orders    CBC    Comprehensive metabolic panel    Lipid panel    TSH, 3rd generation    Quantiferon TB Gold Plus           Chief Complaint     Chief Complaint   Patient presents with   • Well Check     Physical        History of Present Illness     Patient in the office for annual wellness exam.  He has several issues he would like to discuss at this visit. He denies any recent illness. He does exercise 4 to 5 days a week at a local gym. His colonoscopy is up-to-date from November 2022. Patient states he has had a long history of neck and lower back pain. Several years ago he had therapeutic injections to his neck as well as lower back. He was seen by [facility]'s pain management doctor Ashlie. He continues to experience daily low back pain which requires him to ice his back. He denies any radiation to his lower extremities. His last x-rays of lumbar spine were in 2019. Patient also has had difficulties losing weight and was interested in trying medication such as Wegovy. He states his diet is fairly good and he exercises fairly regularly. His parents have a history of being overweight. He feels his added weight may be contributing to his back pain. Patient has also noticed cyanosis and discoloration of his feet and toes bilaterally. He denies any pain in these areas but at times will feel cold extremity. He does sometimes sit for extended periods of time with his working. Review of Systems   Review of Systems   Constitutional: Positive for unexpected weight change. HENT: Negative. Eyes: Negative. Respiratory: Negative. Cardiovascular: Negative. Gastrointestinal: Negative. Endocrine: Negative. Genitourinary: Negative. Musculoskeletal: Positive for arthralgias and back pain. Skin: Positive for color change. Neurological: Negative. Psychiatric/Behavioral: Negative.         Active Problem List     Patient Active Problem List   Diagnosis   • Hypertension   • Generalized anxiety disorder   • Low back pain without sciatica   • Anxiety   • Neck pain   • Allergy to cats   • History of positive PPD   • Impaired fasting glucose   • Chronic left shoulder pain   • Gout   • Acute right-sided thoracic back pain   • Nonintractable headache   • Exposure to COVID-19 virus   • Dermatitis   • Leukopenia   • Obesity (BMI 30-39. 9)   • Cyanosis   • Periodic health assessment, general screening, adult       Past Medical History:  Past Medical History:   Diagnosis Date   • Cervical disc disorder     c6,c7, epidural injections for pain management   • Fatigue    • Generalized anxiety disorder     pt denies   • Hypertension    • SOBOE (shortness of breath on exertion)     pt denies       Past Surgical History:  Past Surgical History:   Procedure Laterality Date   • ESOPHAGOGASTRODUODENOSCOPY     • [place] ESOPHAGOGASTRODUODENOSCOPY TRANSORAL DIAGNOSTIC N/A 6/15/2018    Procedure: ESOPHAGOGASTRODUODENOSCOPY (EGD); Surgeon: Buck Rendon MD;  Location: [facility];   Service: Gastroenterology       Family History:  Family History   Problem Relation Age of Onset   • Hypertension Mother    • Gout Father        Social History:  Social History     Socioeconomic History   • Marital status: /Civil Union     Spouse name: Not on file   • Number of children: Not on file   • Years of education: Not on file   • Highest education level: Not on file   Occupational History   • Not on file   Tobacco Use   • Smoking status: Never   • Smokeless tobacco: Never   Vaping Use   • Vaping Use: Never used   Substance and Sexual Activity   • Alcohol use: Yes     Comment: Social   • Drug use: No   • Sexual activity: Yes   Other Topics Concern   • Not on file   Social History Narrative    Exercises regularly     Social Determinants of Health     Financial Resource Strain: Not on file   Food Insecurity: Not on file   Transportation Needs: Not on file   Physical Activity: Not on file   Stress: Not on file   Social Connections: Not on file   Intimate Partner Violence: Not on file   Housing Stability: Not on file       Objective     Vitals:    08/25/23 0821   BP: 130/80   Pulse: 77   Resp: 17 Temp: (!) 97.1 °F (36.2 °C)   SpO2: 96%     Wt Readings from Last 3 Encounters:   08/25/23 105 kg (230 lb 8 oz)   08/26/22 103 kg (226 lb)   04/04/22 102 kg (224 lb 2 oz)       Physical Exam  Constitutional:       General: He is not in acute distress. Appearance: Normal appearance. He is not ill-appearing. HENT:      Head: Normocephalic and atraumatic. Right Ear: Tympanic membrane, ear canal and external ear normal. There is no impacted cerumen. Left Ear: Tympanic membrane, ear canal and external ear normal. There is no impacted cerumen. Eyes:      General:         Right eye: No discharge. Left eye: No discharge. Extraocular Movements: Extraocular movements intact. Conjunctiva/sclera: Conjunctivae normal.      Pupils: Pupils are equal, round, and reactive to light. Neck:      Vascular: No carotid bruit. Cardiovascular:      Rate and Rhythm: Normal rate and regular rhythm. Heart sounds: Normal heart sounds. No murmur heard. Pulmonary:      Effort: Pulmonary effort is normal.      Breath sounds: Normal breath sounds. No wheezing, rhonchi or rales. Abdominal:      General: Abdomen is flat. Bowel sounds are normal. There is no distension. Palpations: Abdomen is soft. Tenderness: There is no abdominal tenderness. There is no guarding or rebound. Musculoskeletal:      Right lower leg: No edema. Left lower leg: No edema. Lymphadenopathy:      Cervical: No cervical adenopathy. Skin:     Comments: Patient with bluish discoloration of skin from midfoot to toes bilaterally. No ulcerations noted on skin. Skin did feel slightly cool to touch with even bilateral   Neurological:      General: No focal deficit present. Mental Status: He is alert and oriented to person, place, and time. Cranial Nerves: No cranial nerve deficit. Psychiatric:         Mood and Affect: Mood normal.         Behavior: Behavior normal.         Thought Content:  Thought content normal.         Judgment: Judgment normal.         Pertinent Laboratory/Diagnostic Studies:  Lab Results   Component Value Date    GLUCOSE 106 (H) 02/27/2017    BUN 19 04/01/2022    CREATININE 1.12 04/01/2022    CALCIUM 9.5 04/01/2022     02/27/2017    K 4.7 04/01/2022    CO2 28 04/01/2022     04/01/2022     Lab Results   Component Value Date    ALT 41 04/01/2022    AST 21 04/01/2022    ALKPHOS 54 04/01/2022    BILITOT 0.4 02/27/2017       Lab Results   Component Value Date    WBC 3.96 (L) 04/01/2022    HGB 15.4 04/01/2022    HCT 45.8 04/01/2022    MCV 94 04/01/2022     04/01/2022       No results found for: "TSH"    Lab Results   Component Value Date    CHOL 184 02/27/2017     Lab Results   Component Value Date    TRIG 86 04/01/2022     Lab Results   Component Value Date    HDL 48 04/01/2022     Lab Results   Component Value Date    LDLCALC 136 (H) 04/01/2022     No results found for: "HGBA1C"    Results for orders placed or performed in visit on 04/01/22   Quantiferon TB Gold Plus   Result Value Ref Range    QFT Nil 0.03 0 - 8.0 IU/ml    QFT TB1-NIL 0.03 IU/ml    QFT TB2-NIL 0.02 IU/ml    QFT Mitogen-NIL >10.00 IU/ml    QFT Final Interpretation Negative Negative   CBC   Result Value Ref Range    WBC 3.96 (L) 4.31 - 10.16 Thousand/uL    RBC 4.85 3.88 - 5.62 Million/uL    Hemoglobin 15.4 12.0 - 17.0 g/dL    Hematocrit 45.8 36.5 - 49.3 %    MCV 94 82 - 98 fL    MCH 31.8 26.8 - 34.3 pg    MCHC 33.6 31.4 - 37.4 g/dL    RDW 12.2 11.6 - 15.1 %    Platelets 241 237 - 746 Thousands/uL    MPV 10.4 8.9 - 12.7 fL   Comprehensive metabolic panel   Result Value Ref Range    Sodium 136 136 - 145 mmol/L    Potassium 4.7 3.5 - 5.3 mmol/L    Chloride 104 100 - 108 mmol/L    CO2 28 21 - 32 mmol/L    ANION GAP 4 4 - 13 mmol/L    BUN 19 5 - 25 mg/dL    Creatinine 1.12 0.60 - 1.30 mg/dL    Glucose, Fasting 87 65 - 99 mg/dL    Calcium 9.5 8.3 - 10.1 mg/dL    AST 21 5 - 45 U/L    ALT 41 12 - 78 U/L    Alkaline Phosphatase 54 46 - 116 U/L    Total Protein 7.4 6.4 - 8.2 g/dL    Albumin 4.3 3.5 - 5.0 g/dL    Total Bilirubin 0.87 0.20 - 1.00 mg/dL    eGFR 78 ml/min/1.73sq m   Lipid panel   Result Value Ref Range    Cholesterol 201 (H) See Comment mg/dL    Triglycerides 86 See Comment mg/dL    HDL, Direct 48 >=40 mg/dL    LDL Calculated 136 (H) 0 - 100 mg/dL    Non-HDL-Chol (CHOL-HDL) 153 mg/dl       Orders Placed This Encounter   Procedures   • XR spine lumbar minimum 4 views non injury   • CBC   • Comprehensive metabolic panel   • Lipid panel   • TSH, 3rd generation   • Quantiferon TB Gold Plus   • Ambulatory Referral to Orthopedic Surgery       ALLERGIES:  No Known Allergies    Current Medications     Current Outpatient Medications   Medication Sig Dispense Refill   • olmesartan (BENICAR) 20 mg tablet Take 1 tablet (20 mg total) by mouth daily 90 tablet 0   • omeprazole (PriLOSEC) 20 mg delayed release capsule TAKE 2 CAPSULES BY MOUTH TWICE A  capsule 3   • Semaglutide-Weight Management (WEGOVY) 0.25 MG/0.5ML Inject 0.5 mL (0.25 mg total) under the skin once a week 3 mL 0   • fluocinonide (LIDEX) 0.05 % cream Apply 1 application topically 2 (two) times a day To affected area (Patient not taking: Reported on 8/25/2023)       No current facility-administered medications for this visit.          Health Maintenance     Health Maintenance   Topic Date Due   • Hepatitis C Screening  Never done   • HIV Screening  Never done   • COVID-19 Vaccine (6 - Mixed Product series) 12/21/2021   • BMI: Followup Plan  03/15/2022   • Annual Physical  04/04/2023   • Influenza Vaccine (1) 09/01/2023   • Depression Screening  08/25/2024   • BMI: Adult  08/25/2024   • DTaP,Tdap,and Td Vaccines (8 - Td or Tdap) 02/15/2027   • Colorectal Cancer Screening  11/22/2032   • IPV Vaccine  Completed   • Pneumococcal Vaccine: Pediatrics (0 to 5 Years) and At-Risk Patients (6 to 59 Years)  Aged Out   • HIB Vaccine  Aged Out   • Hepatitis A Vaccine  Aged Out   • Meningococcal ACWY Vaccine  Aged Out   • HPV Vaccine  Aged Out     Immunization History   Administered Date(s) Administered   • COVID-19 MODERNA VACC 0.5 ML IM 01/29/2021, 02/26/2021, 10/26/2021   • COVID-19, unspecified 01/29/2021, 02/26/2021   • DT (pediatric) 07/19/1990   • DTP 1975, 02/13/1976, 05/18/1976, 12/21/1976, 06/03/1981   • Hep B, Adolescent or Pediatric 04/01/1993, 05/08/1993, 05/06/1994   • INFLUENZA 11/06/2018, 02/10/2021   • Influenza, injectable, quadrivalent, preservative free 0.5 mL 11/06/2018, 09/20/2019   • MMR 09/29/1976, 06/22/1982   • OPV 01/13/1976, 03/17/1976, 06/22/1976, 04/04/1977   • Tdap 06/16/2006, 75/31/7673       Rebel Sher MD    I spent 30 minutes with this patient of which greater than 50% was spent counseling or reviewing chart

## 2023-08-25 NOTE — ASSESSMENT & PLAN NOTE
Obesity. Patient with BMI of 31. He was given prescription to initiate Wegovy injection once a week as directed.   Patient will check with his insurance coverage

## 2023-08-25 NOTE — ASSESSMENT & PLAN NOTE
Cyanosis. Patient with bluish discoloration of bilateral toes and midfoot. Sensation intact. Patient will obtain arterial Doppler study of the lower extremities for further evaluation.   We will make further recommendations pending results of test.

## 2023-08-25 NOTE — ASSESSMENT & PLAN NOTE
Back pain. Patient given prescription to obtain x-ray of lumbar spine.   He was given referral to [facility] orthopedist Dr. Merlinda Blazing for evaluation

## 2023-08-25 NOTE — ASSESSMENT & PLAN NOTE
Well adult. Overall patient appears to be in stable health. He will obtain blood work as ordered for further evaluation. We will make further recommendations pending results of test.  His colonoscopy is up-to-date.

## 2023-08-28 LAB
GAMMA INTERFERON BACKGROUND BLD IA-ACNC: 0.04 IU/ML
M TB IFN-G BLD-IMP: NEGATIVE
M TB IFN-G CD4+ BCKGRND COR BLD-ACNC: -0.01 IU/ML
M TB IFN-G CD4+ BCKGRND COR BLD-ACNC: 0.02 IU/ML
MITOGEN IGNF BCKGRD COR BLD-ACNC: >10 IU/ML

## 2023-09-26 ENCOUNTER — OFFICE VISIT (OUTPATIENT)
Dept: FAMILY MEDICINE CLINIC | Facility: CLINIC | Age: 48
End: 2023-09-26
Payer: COMMERCIAL

## 2023-09-26 VITALS
WEIGHT: 234.2 LBS | RESPIRATION RATE: 18 BRPM | BODY MASS INDEX: 31.72 KG/M2 | SYSTOLIC BLOOD PRESSURE: 136 MMHG | TEMPERATURE: 98.4 F | OXYGEN SATURATION: 96 % | HEART RATE: 80 BPM | HEIGHT: 72 IN | DIASTOLIC BLOOD PRESSURE: 84 MMHG

## 2023-09-26 DIAGNOSIS — R05.9 COUGH, UNSPECIFIED TYPE: Primary | ICD-10-CM

## 2023-09-26 DIAGNOSIS — I10 HYPERTENSION, UNSPECIFIED TYPE: ICD-10-CM

## 2023-09-26 PROCEDURE — 99213 OFFICE O/P EST LOW 20 MIN: CPT | Performed by: FAMILY MEDICINE

## 2023-09-26 RX ORDER — AZITHROMYCIN 250 MG/1
TABLET, FILM COATED ORAL
Qty: 6 TABLET | Refills: 0 | Status: SHIPPED | OUTPATIENT
Start: 2023-09-26 | End: 2023-10-01

## 2023-09-26 RX ORDER — OLMESARTAN MEDOXOMIL 20 MG/1
20 TABLET ORAL DAILY
Qty: 90 TABLET | Refills: 0 | Status: SHIPPED | OUTPATIENT
Start: 2023-09-26

## 2023-09-26 RX ORDER — BENZONATATE 100 MG/1
100 CAPSULE ORAL 3 TIMES DAILY PRN
Qty: 20 CAPSULE | Refills: 0 | Status: SHIPPED | OUTPATIENT
Start: 2023-09-26

## 2023-09-26 NOTE — PROGRESS NOTES
Name: Radha Handley      : 1975      MRN: [id number]  Encounter Provider: Gopi Morrissey DO  Encounter Date: 2023   Encounter department: [address] [place] [address]. Cough, unspecified type  -     benzonatate (TESSALON PERLES) 100 mg capsule; Take 1 capsule (100 mg total) by mouth 3 (three) times a day as needed for cough  -     azithromycin (Zithromax) 250 mg tablet; Take 2 tablets (500 mg total) by mouth daily for 1 day, THEN 1 tablet (250 mg total) daily for 4 days. Call if not improved. Subjective      HPI   Cough for 10 days with rhinorrhea, whole family is sick. He and his wife have persistent symptoms but kids are improved. No fever or chills, no myalgias. Has tested for COVID several times, all negative. Taking Dayquil and Nyquil which has not helped. Review of Systems    Current Outpatient Medications on File Prior to Visit   Medication Sig   • olmesartan (BENICAR) 20 mg tablet Take 1 tablet (20 mg total) by mouth daily   • omeprazole (PriLOSEC) 20 mg delayed release capsule TAKE 2 CAPSULES BY MOUTH TWICE A DAY   • Semaglutide-Weight Management (WEGOVY) 0.25 MG/0.5ML Inject 0.5 mL (0.25 mg total) under the skin once a week   • fluocinonide (LIDEX) 0.05 % cream Apply 1 application topically 2 (two) times a day To affected area (Patient not taking: Reported on 2023)       Objective     /84 (BP Location: Left arm, Patient Position: Sitting, Cuff Size: Standard)   Pulse 80   Temp 98.4 °F (36.9 °C) (Temporal)   Resp 18   Ht 6' (1.829 m)   Wt 106 kg (234 lb 3.2 oz)   SpO2 96%   BMI 31.76 kg/m²     Physical Exam  Vitals reviewed. Constitutional:       Appearance: Normal appearance. HENT:      Head: Normocephalic and atraumatic. Right Ear: External ear normal.      Left Ear: External ear normal.      Nose: Rhinorrhea present. Mouth/Throat:      Mouth: Mucous membranes are moist.      Pharynx: Oropharynx is clear.    Eyes: Extraocular Movements: Extraocular movements intact. Conjunctiva/sclera: Conjunctivae normal.   Cardiovascular:      Rate and Rhythm: Normal rate and regular rhythm. Heart sounds: Normal heart sounds. Pulmonary:      Effort: Pulmonary effort is normal.      Breath sounds: Normal breath sounds. Abdominal:      General: Abdomen is flat. Skin:     General: Skin is warm and dry. Neurological:      Mental Status: He is alert.    Psychiatric:         Mood and Affect: Mood normal.         Behavior: Behavior normal.       Dorina Mayo,

## 2023-10-12 ENCOUNTER — OFFICE VISIT (OUTPATIENT)
Dept: FAMILY MEDICINE CLINIC | Facility: CLINIC | Age: 48
End: 2023-10-12
Payer: COMMERCIAL

## 2023-10-12 VITALS — OXYGEN SATURATION: 97 % | SYSTOLIC BLOOD PRESSURE: 150 MMHG | HEART RATE: 86 BPM | DIASTOLIC BLOOD PRESSURE: 100 MMHG

## 2023-10-12 DIAGNOSIS — M10.9 ACUTE GOUT INVOLVING TOE OF RIGHT FOOT, UNSPECIFIED CAUSE: Primary | ICD-10-CM

## 2023-10-12 PROCEDURE — 99213 OFFICE O/P EST LOW 20 MIN: CPT | Performed by: FAMILY MEDICINE

## 2023-10-12 RX ORDER — PREDNISONE 10 MG/1
TABLET ORAL
Qty: 30 TABLET | Refills: 0 | Status: SHIPPED | OUTPATIENT
Start: 2023-10-12 | End: 2023-10-24

## 2023-10-12 NOTE — ASSESSMENT & PLAN NOTE
Last flare 9 years ago, feels similar. No obvious cause, discussed low purine diet and provided with educational info. Not improving with Ibuprofen. Will give Prednisone taper and follow up if not improved.

## 2023-10-12 NOTE — PROGRESS NOTES
Name: Ayush Carrasquillo      : 1975      MRN: [id number]  Encounter Provider: Jesse Friend DO  Encounter Date: 10/12/2023   Encounter department: [address] [place] [address]. Acute gout involving toe of right foot, unspecified cause  Assessment & Plan:  Last flare 9 years ago, feels similar. No obvious cause, discussed low purine diet and provided with educational info. Not improving with Ibuprofen. Will give Prednisone taper and follow up if not improved. Orders:  -     predniSONE 10 mg tablet; Take 4 tablets (40 mg total) by mouth daily for 3 days, THEN 3 tablets (30 mg total) daily for 3 days, THEN 2 tablets (20 mg total) daily for 3 days, THEN 1 tablet (10 mg total) daily for 3 days. Subjective      HPI  Right foot pain for 4-5 days, yesterday 10/10. Has been taking a lot of Ibuprofen without relief. Last gout flare was 9 years ago. Review of Systems as in HPI    Current Outpatient Medications on File Prior to Visit   Medication Sig   • benzonatate (TESSALON PERLES) 100 mg capsule Take 1 capsule (100 mg total) by mouth 3 (three) times a day as needed for cough   • olmesartan (BENICAR) 20 mg tablet TAKE 1 TABLET BY MOUTH EVERY DAY   • omeprazole (PriLOSEC) 20 mg delayed release capsule TAKE 2 CAPSULES BY MOUTH TWICE A DAY   • Semaglutide-Weight Management (WEGOVY) 0.25 MG/0.5ML Inject 0.5 mL (0.25 mg total) under the skin once a week   • fluocinonide (LIDEX) 0.05 % cream Apply 1 application topically 2 (two) times a day To affected area (Patient not taking: Reported on 2023)       Objective     /100   Pulse 86   SpO2 97%     Physical Exam  Constitutional:       Appearance: Normal appearance. Musculoskeletal:      Comments: R great toe erythematous, swollen, exquisitely tender to palpation   Neurological:      Mental Status: He is alert.    Psychiatric:         Mood and Affect: Mood normal.         Behavior: Behavior normal.       Jesse Friend DO

## 2023-10-16 ENCOUNTER — TELEPHONE (OUTPATIENT)
Dept: FAMILY MEDICINE CLINIC | Facility: CLINIC | Age: 48
End: 2023-10-16

## 2023-10-16 NOTE — TELEPHONE ENCOUNTER
Patient called and stated that the Gout he is experiencing is still very painful, very hot, sensitive and didn't know if there was anything else he should be doing?   Please call to advise

## 2023-10-24 PROBLEM — Z00.00 PERIODIC HEALTH ASSESSMENT, GENERAL SCREENING, ADULT: Status: RESOLVED | Noted: 2023-08-25 | Resolved: 2023-10-24

## 2023-10-25 ENCOUNTER — TELEMEDICINE (OUTPATIENT)
Dept: FAMILY MEDICINE CLINIC | Facility: CLINIC | Age: 48
End: 2023-10-25
Payer: COMMERCIAL

## 2023-10-25 VITALS — WEIGHT: 234 LBS | BODY MASS INDEX: 31.69 KG/M2 | HEIGHT: 72 IN

## 2023-10-25 DIAGNOSIS — B02.9 HERPES ZOSTER WITHOUT COMPLICATION: Primary | ICD-10-CM

## 2023-10-25 PROCEDURE — 99213 OFFICE O/P EST LOW 20 MIN: CPT | Performed by: FAMILY MEDICINE

## 2023-10-25 RX ORDER — VALACYCLOVIR HYDROCHLORIDE 1 G/1
1000 TABLET, FILM COATED ORAL 3 TIMES DAILY
Qty: 21 TABLET | Refills: 0 | Status: SHIPPED | OUTPATIENT
Start: 2023-10-25 | End: 2023-11-01

## 2023-10-25 RX ORDER — CAPSAICIN 0.75 MG/G
CREAM TOPICAL 3 TIMES DAILY
Qty: 57 G | Refills: 0 | Status: SHIPPED | OUTPATIENT
Start: 2023-10-25

## 2023-10-25 NOTE — PROGRESS NOTES
Name: Lorie Weiss      : 1975      MRN: [id number]  Encounter Provider: Ruben Caraballo DO  Encounter Date: 10/25/2023   Encounter department: [address] [place] [address]     1. Herpes zoster without complication  Assessment & Plan:  Shingles rash developed after viral illness last week. Rash is burning and slightly itchy. Recommend topical calamine while blisters are intact, can use topical capsaicin once they have crusted over. Start Valtrex 1 g 3 times daily for 1 week. Follow-up if not improved. Orders:  -     capsicum (ZOSTRIX) 0.075 % topical cream; Apply topically 3 (three) times a day  -     valACYclovir (VALTREX) 1,000 mg tablet; Take 1 tablet (1,000 mg total) by mouth 3 (three) times a day for 7 days           Subjective      HPI  Not feeling well the last few week, improving since 3 days ago. Now developed burning itching blistering rash on the right inner thigh    Review of Systems    Current Outpatient Medications on File Prior to Visit   Medication Sig   • olmesartan (BENICAR) 20 mg tablet TAKE 1 TABLET BY MOUTH EVERY DAY   • omeprazole (PriLOSEC) 20 mg delayed release capsule TAKE 2 CAPSULES BY MOUTH TWICE A DAY   • benzonatate (TESSALON PERLES) 100 mg capsule Take 1 capsule (100 mg total) by mouth 3 (three) times a day as needed for cough (Patient not taking: Reported on 10/25/2023)   • fluocinonide (LIDEX) 0.05 % cream Apply 1 application topically 2 (two) times a day To affected area (Patient not taking: Reported on 2023)   • [] predniSONE 10 mg tablet Take 4 tablets (40 mg total) by mouth daily for 3 days, THEN 3 tablets (30 mg total) daily for 3 days, THEN 2 tablets (20 mg total) daily for 3 days, THEN 1 tablet (10 mg total) daily for 3 days.    • Semaglutide-Weight Management (WEGOVY) 0.25 MG/0.5ML Inject 0.5 mL (0.25 mg total) under the skin once a week (Patient not taking: Reported on 10/25/2023)       Objective     Ht 6' (1.829 m)   Wt 106 kg (234 lb)   BMI 31.74 kg/m²     Physical Exam  Vesicular rash on erythematous base on right inner thigh L2 dermatomal pattern.     Ernie Minors, DO

## 2023-10-25 NOTE — ASSESSMENT & PLAN NOTE
Shingles rash developed after viral illness last week. Rash is burning and slightly itchy. Recommend topical calamine while blisters are intact, can use topical capsaicin once they have crusted over. Start Valtrex 1 g 3 times daily for 1 week. Follow-up if not improved.

## 2023-10-31 ENCOUNTER — TELEPHONE (OUTPATIENT)
Dept: FAMILY MEDICINE CLINIC | Facility: CLINIC | Age: 48
End: 2023-10-31

## 2023-10-31 NOTE — TELEPHONE ENCOUNTER
Patient called to state that he was treated for Shingles by Dr Marko Garcia on 10/25 and was wondering when he would be able to get his flu shot. 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 If he cannot yet get one, may he have a letter stating why he cannot receive the flu shot yet? Please advise.

## 2023-10-31 NOTE — LETTER
November 1, 2023     Patient: Reji Baugh  YOB: 1975  Date of Visit: 10/31/2023      To Whom it May Concern:    Reji Baugh is under my professional care. Isidro Diop was seen in my office on 10/31/2023. Isidro Diop is being treated for shingles at this time and it is my recommendation he wait until the shingles rash is resolved prior to having his flu shot. I expect this should be resolved in the next 1-2 weeks. If you have any questions or concerns, please don't hesitate to call.          Sincerely,          Rebel Sher MD        CC: No Recipients

## 2023-11-01 NOTE — TELEPHONE ENCOUNTER
Patient advised and acknowledged, he would like a note explaining why he cannot yet have his flu shot for work.

## 2023-11-01 NOTE — TELEPHONE ENCOUNTER
I would recommend waiting until the rash is resolved before he gets a flu shot. Hopefully that is within a couple of weeks. If he needs a note he can have one.

## 2023-12-13 DIAGNOSIS — I10 HYPERTENSION, UNSPECIFIED TYPE: ICD-10-CM

## 2023-12-13 RX ORDER — OLMESARTAN MEDOXOMIL 20 MG/1
20 TABLET ORAL DAILY
Qty: 90 TABLET | Refills: 0 | Status: SHIPPED | OUTPATIENT
Start: 2023-12-13

## 2023-12-21 NOTE — TELEPHONE ENCOUNTER
End of Shift Note: Medical    Pain Management: Patient denies any pain     Significant events:   Last CIWA @ 0630, score 4  Last Q4 CIWA at 1030    Diet: Regular Diet      Bowel Function:  Constipation  LBM:  Stool Occurrence: 1 (12/20/23 2220)   Activity:  Activity: Sleeping/Appeared to be (12/21/23 0336)  Mobility Assistive Device:  Mobility Assistive Device: Walker;Gait belt (12/21/23 0336)  Level of Assistance:  Level of Assistance: Supervision (12/21/23 0336)  Positioning: Positioning: Lying R side (12/21/23 0336)  LDAs: peripheral IV   Patient's Anticipated Discharge Needs: Anticipated discharge needs eval completed (12/20/23 1433)  Expected Discharge Date: TBD  Expected Discharge Time: TBD       Spoke w/ pt and gave MD instructions

## 2024-03-15 DIAGNOSIS — I10 HYPERTENSION, UNSPECIFIED TYPE: ICD-10-CM

## 2024-03-15 RX ORDER — OLMESARTAN MEDOXOMIL 20 MG/1
20 TABLET ORAL DAILY
Qty: 90 TABLET | Refills: 0 | Status: SHIPPED | OUTPATIENT
Start: 2024-03-15

## 2024-03-26 DIAGNOSIS — M48.02 SPINAL STENOSIS OF CERVICAL REGION: Primary | ICD-10-CM

## 2024-03-26 DIAGNOSIS — M54.12 CERVICAL RADICULOPATHY: Primary | ICD-10-CM

## 2024-04-04 ENCOUNTER — HOSPITAL ENCOUNTER (OUTPATIENT)
Dept: MRI IMAGING | Facility: HOSPITAL | Age: 49
End: 2024-04-04
Payer: COMMERCIAL

## 2024-04-04 ENCOUNTER — HOSPITAL ENCOUNTER (OUTPATIENT)
Dept: RADIOLOGY | Facility: HOSPITAL | Age: 49
End: 2024-04-04
Payer: COMMERCIAL

## 2024-04-04 DIAGNOSIS — M54.50 CHRONIC LOW BACK PAIN WITHOUT SCIATICA, UNSPECIFIED BACK PAIN LATERALITY: ICD-10-CM

## 2024-04-04 DIAGNOSIS — M54.12 CERVICAL RADICULOPATHY: ICD-10-CM

## 2024-04-04 DIAGNOSIS — G89.29 CHRONIC LOW BACK PAIN WITHOUT SCIATICA, UNSPECIFIED BACK PAIN LATERALITY: ICD-10-CM

## 2024-04-04 PROCEDURE — 72110 X-RAY EXAM L-2 SPINE 4/>VWS: CPT

## 2024-04-04 PROCEDURE — 72141 MRI NECK SPINE W/O DYE: CPT

## 2024-04-09 ENCOUNTER — HOSPITAL ENCOUNTER (OUTPATIENT)
Dept: VASCULAR ULTRASOUND | Facility: HOSPITAL | Age: 49
Discharge: HOME | End: 2024-04-09
Payer: COMMERCIAL

## 2024-04-09 DIAGNOSIS — R23.0 CYANOSIS: ICD-10-CM

## 2024-04-09 PROCEDURE — 93925 LOWER EXTREMITY STUDY: CPT | Performed by: SURGERY

## 2024-04-09 PROCEDURE — 93925 LOWER EXTREMITY STUDY: CPT

## 2024-04-09 PROCEDURE — 93922 UPR/L XTREMITY ART 2 LEVELS: CPT | Performed by: SURGERY

## 2024-04-09 PROCEDURE — 93923 UPR/LXTR ART STDY 3+ LVLS: CPT

## 2024-04-15 ENCOUNTER — HOSPITAL ENCOUNTER (OUTPATIENT)
Dept: RADIOLOGY | Facility: HOSPITAL | Age: 49
Discharge: HOME | End: 2024-04-15
Attending: ORTHOPAEDIC SURGERY
Payer: COMMERCIAL

## 2024-04-15 ENCOUNTER — OFFICE VISIT (OUTPATIENT)
Dept: OBGYN CLINIC | Facility: HOSPITAL | Age: 49
End: 2024-04-15
Payer: COMMERCIAL

## 2024-04-15 VITALS
HEART RATE: 75 BPM | HEIGHT: 72 IN | BODY MASS INDEX: 31.65 KG/M2 | SYSTOLIC BLOOD PRESSURE: 142 MMHG | WEIGHT: 233.69 LBS | DIASTOLIC BLOOD PRESSURE: 83 MMHG

## 2024-04-15 DIAGNOSIS — R52 PAIN: Primary | ICD-10-CM

## 2024-04-15 DIAGNOSIS — R52 PAIN: ICD-10-CM

## 2024-04-15 DIAGNOSIS — G89.29 CHRONIC LOW BACK PAIN WITHOUT SCIATICA, UNSPECIFIED BACK PAIN LATERALITY: ICD-10-CM

## 2024-04-15 DIAGNOSIS — M54.50 CHRONIC LOW BACK PAIN WITHOUT SCIATICA, UNSPECIFIED BACK PAIN LATERALITY: ICD-10-CM

## 2024-04-15 DIAGNOSIS — M54.12 CERVICAL RADICULOPATHY: ICD-10-CM

## 2024-04-15 DIAGNOSIS — E66.9 OBESITY (BMI 30-39.9): ICD-10-CM

## 2024-04-15 PROCEDURE — 72050 X-RAY EXAM NECK SPINE 4/5VWS: CPT

## 2024-04-15 PROCEDURE — 72100 X-RAY EXAM L-S SPINE 2/3 VWS: CPT

## 2024-04-15 PROCEDURE — 99204 OFFICE O/P NEW MOD 45 MIN: CPT | Performed by: ORTHOPAEDIC SURGERY

## 2024-04-15 RX ORDER — GABAPENTIN 300 MG/1
300 CAPSULE ORAL
Qty: 30 CAPSULE | Refills: 0 | Status: SHIPPED | OUTPATIENT
Start: 2024-04-15

## 2024-04-15 NOTE — PROGRESS NOTES
Assessment & Plan/Medical Decision Makin y.o. male with neck, bilateral arm pain,and back pain and imaging findings most notable for cervical and lumbar spondylosis        The clinical, physical and imaging findings were reviewed with the patient.  Michael  has a constellation of findings consistent with cervical radiculopathy in setting cervical degenerative disease. Also with Lumbar Facet/Arthrosis Pain and Lumbar Disc Pain in the setting of lumbar degenerative disease. Ongoing neck pain with bilateral periscapular and shoulder pain, numbness/tingling in ulnar aspect of hands bilaterally. No symptoms of cervical myelopathy. Also with ongoing back pain, no radicular symptoms.    Fortunately patient remains neurologically intact and functional. Physical exam showing +hyperreflexia, decreased ROM, bilateral tight hamstrings, no jin weakness.  We discussed the treatment options including physical therapy, at home exercises, activity modifications, chiropractic medicine, oral medications, interventional spine procedures.  At this time recommend continued conservative treatments. Given patient remains overall functional, would not recommend spine surgical intervention at this time.    Gabapentin rx sent to patient's pharmacy. Discussed reasoning for prescribing medication, proper usage, and potential side effects.  Referral to pain management for evaluation and treatment. Discussed potential role of steroid injection at or near the source of pain to provide targeted relief.  Referral placed for physical therapy to work on core strengthening, cervical and lumbar ROM, strengthening, and stretching exercises. Will discuss obtaining lumbar MRI for further evaluation if pain persist despite 6 weeks of formal physical therapy.    Patient instructed to return to office/ER sooner if symptoms are not improving, getting worse, or new worrisome/neurologic symptoms arise.  Patient will follow up if pain persists.      Subjective:      Chief Complaint: Neck and Back Pain    HPI:  Michael Mahmood is a 48 y.o. male presenting for initial visit with chief complaint of neck and back pain. R hand dominant. Reports ongoing, worsening neck pain for years with radiation into bilateral periscapular and shoulder region. Denies radiation of pain into upper extremities. Does report numbness/tingling in ulnar aspect of hands into ring and long finger bilaterally. Notes arms feel constantly heavy. Pain worse with certain neck movements, improves slightly with traction-type/stretching exercises and heat/ice. Denies balance issues or falls. Denies dropping items. Denies changes in fine motor skills or dexterity. Denies changes in handwriting. Denies jin upper extremity weakness. Neck pain > back pain currently.    Ongoing back pain for years. Notes his back feels tight and stiff. Worse with prolonged standing and with longer bike rides. Also worse with back extension. Able to walk >1 mile at a time. He is able to function on a daily basis but pain/soreness is constant and bothersome. Denies radiation of pain or numbness/tingling into his lower extremities. Denies any jin trauma. Denies fever or chills, no night sweats. Denies any bladder or bowel changes.      Denies heart or lung disease. Denies diabetes or kidney disease.    Conservative therapy includes the following:   Medications: ibuprofen as needed, heat/ice as needed.    Injections: no relief  3/11/2019 - RFA right L3, L4, L5  2/11/2019 - bilateral L3, L4, L5 MBB  2/4/2019 - bilateral L3, L4, L5 MBB  1/21/2019 - C7-T1 ALEXANDRO  Underwent multiple of cervical and lumbar injections years ago with some relief at that time  Physical Therapy: denies recent  Chiropractic Medicine: has not attempted  Accupunture/Massage Therapy: has not attempted   These therapeutic modalities were ineffective at providing sustained pain relief/functional improvement.     Nicotine dependent: denies  Occupation:  works in OR with spine equipment/vendor  Living situation: Lives with family   ADLs: patient is able to perform     Objective:     Family History   Problem Relation Age of Onset    Hypertension Mother     Gout Father        Past Medical History:   Diagnosis Date    Cervical disc disorder     c6,c7, epidural injections for pain management    Fatigue     Generalized anxiety disorder     pt denies    Hypertension     SOBOE (shortness of breath on exertion)     pt denies       Current Outpatient Medications   Medication Sig Dispense Refill    capsicum (ZOSTRIX) 0.075 % topical cream Apply topically 3 (three) times a day 57 g 0    gabapentin (Neurontin) 300 mg capsule Take 1 capsule (300 mg total) by mouth daily at bedtime Gabapentin may cause vision changes, clumsiness, unsteadiness, dizziness, drowsiness, sleepiness, or trouble with thinking. 30 capsule 0    olmesartan (BENICAR) 20 mg tablet TAKE 1 TABLET BY MOUTH EVERY DAY 90 tablet 0    omeprazole (PriLOSEC) 20 mg delayed release capsule TAKE 2 CAPSULES BY MOUTH TWICE A  capsule 3    benzonatate (TESSALON PERLES) 100 mg capsule Take 1 capsule (100 mg total) by mouth 3 (three) times a day as needed for cough (Patient not taking: Reported on 10/25/2023) 20 capsule 0    fluocinonide (LIDEX) 0.05 % cream Apply 1 application topically 2 (two) times a day To affected area (Patient not taking: Reported on 8/25/2023)      Semaglutide-Weight Management (WEGOVY) 0.25 MG/0.5ML Inject 0.5 mL (0.25 mg total) under the skin once a week (Patient not taking: Reported on 10/25/2023) 3 mL 0    valACYclovir (VALTREX) 1,000 mg tablet Take 1 tablet (1,000 mg total) by mouth 3 (three) times a day for 7 days 21 tablet 0     No current facility-administered medications for this visit.       Past Surgical History:   Procedure Laterality Date    ESOPHAGOGASTRODUODENOSCOPY      [place] ESOPHAGOGASTRODUODENOSCOPY TRANSORAL DIAGNOSTIC N/A 6/15/2018    Procedure: ESOPHAGOGASTRODUODENOSCOPY  (EGD);  Surgeon: Ayan Boykin MD;  Location: [facility];  Service: Gastroenterology       Social History     Socioeconomic History    Marital status: /Civil Union     Spouse name: Not on file    Number of children: Not on file    Years of education: Not on file    Highest education level: Not on file   Occupational History    Not on file   Tobacco Use    Smoking status: Never    Smokeless tobacco: Never   Vaping Use    Vaping status: Never Used   Substance and Sexual Activity    Alcohol use: Yes     Comment: Social    Drug use: No    Sexual activity: Yes   Other Topics Concern    Not on file   Social History Narrative    Exercises regularly     Social Determinants of Health     Financial Resource Strain: Not on file   Food Insecurity: Not on file   Transportation Needs: Not on file   Physical Activity: Not on file   Stress: Not on file   Social Connections: Not on file   Intimate Partner Violence: Not on file   Housing Stability: Not on file       No Known Allergies    Review of Systems  General- denies fever/chills  HEENT- denies hearing loss or sore throat  Eyes- denies eye pain or visual disturbances, denies red eyes  Respiratory- denies cough or SOB  Cardio- denies chest pain or palpitations  GI- denies abdominal pain  Endocrine- denies urinary frequency  Urinary- denies pain with urination  Musculoskeletal- Negative except noted above  Skin- denies rashes or wounds  Neurological- denies dizziness or headache  Psychiatric- denies anxiety or difficulty concentrating    Physical Exam  /83   Pulse 75   Ht 6' (1.829 m)   Wt 106 kg (233 lb 11 oz)   BMI 31.69 kg/m²     General/Constitutional: No apparent distress: well-nourished and well developed.  Lymphatic: No appreciable lymphadenopathy  Respiratory: Non-labored breathing  Vascular: No edema, swelling or tenderness, except as noted in detailed exam.  Integumentary: No impressive skin lesions present, except as noted in detailed exam.  Psych:  Normal mood and affect, oriented to person, place and time.  MSK: normal other than stated in HPI and exam  Gait & balance: no evidence of myelopathic gait, ambulates Independently     Cervical  spine range of motion:  -Forward flexion chin to chest  -Extension to 40  -Lateral bend 30 right, 30 left  -Rotation 45 right, 45 left.      Lumbar spine range of motion:  -Forward flexion to 90  -Extension to neutral  -Lateral bend 25 right, 25 left  -Rotation 25 right, 25 left  There tenderness with palpation along lumbar paraspinal musculature, no midline tenderness     Neurologic:  Upper Extremity Motor Function    Right  Left    Deltoid  5/5  5/5    Bicep  5/5  5/5    Wrist extension  5/5  5/5    Tricep  5/5  5/5    Finger flexion/  5/5  5/5    Hand intrinsic  5/5  5/5      Lower Extremity Motor Function    Right  Left    Iliopsoas  5/5  5/5    Quadriceps 5/5 5/5   Tibialis anterior  5/5  5/5    EHL  5/5  5/5    Gastroc. muscle  5/5  5/5    Heel rise  5/5  5/5    Toe rise  5/5  5/5      Sensory: light touch is intact to bilateral upper and lower extremities     Reflexes:    Right Left   Patellar 3+ 3+   Achilles 1+ 1+   Babinski neg neg     Other tests:  Straight Leg Raise: negative  Meg SI: negative  CAROLYNN SI: negative  Greater troch: no tenderness   Internal/external hip ROM: intact, no pain   Flexion/extension knee ROM: intact, no pain   Vascular: WWP extremities, 2+DP bilateral    Montgomery: negative  Inverted radial reflex: negative  Tandem gait: equivocal  Romberg: equivocal  +bilateral tight hamstrings    Diagnostic Tests   IMAGING: I have personally reviewed the images and these are my findings:  Cervical Spine X-rays from 4/15/2024: multi level cervical spondylosis with loss of disc height, osteophyte formation and uncovertebral hypertrophy, no apparent spondylolisthesis, no appreciated lytic/blastic lesions, no obvious instability    Lumbar Spine X-rays from 4/15/2024: multi level lumbar spondylosis with  "loss of disc height notably L4-5, osteophyte formation and facet hypertrophy, no apparent spondylolisthesis, no appreciated lytic/blastic lesions, no obvious instability    Cervical Spine MRI from 4/4/2024: multi level cervical disc degeneration with disc desiccation, loss of disc height, facet and ligamentum hypertrophy, varying degrees of central, lateral recess, foraminal stenosis, notably mild/moderate central stenosis at C6-7 with right > left foraminal narrowing    Electronic Medical Records were reviewed including office notes, imaging studies.    Procedures, if performed today     None performed       Portions of the record may have been created with voice recognition software.  Occasional wrong word or \"sound a like\" substitutions may have occurred due to the inherent limitations of voice recognition software.  Read the chart carefully and recognize, using context, where substitutions have occurred.  "

## 2024-04-25 DIAGNOSIS — E66.9 OBESITY (BMI 30-39.9): ICD-10-CM

## 2024-05-01 ENCOUNTER — CONSULT (OUTPATIENT)
Dept: PAIN MEDICINE | Facility: CLINIC | Age: 49
End: 2024-05-01
Payer: COMMERCIAL

## 2024-05-01 VITALS
DIASTOLIC BLOOD PRESSURE: 72 MMHG | BODY MASS INDEX: 31.29 KG/M2 | WEIGHT: 231 LBS | HEIGHT: 72 IN | SYSTOLIC BLOOD PRESSURE: 140 MMHG

## 2024-05-01 DIAGNOSIS — M48.02 CERVICAL SPINAL STENOSIS: ICD-10-CM

## 2024-05-01 DIAGNOSIS — M54.12 CERVICAL RADICULOPATHY: Primary | ICD-10-CM

## 2024-05-01 DIAGNOSIS — M54.16 LUMBAR RADICULOPATHY: ICD-10-CM

## 2024-05-01 PROCEDURE — 99204 OFFICE O/P NEW MOD 45 MIN: CPT | Performed by: ANESTHESIOLOGY

## 2024-05-01 NOTE — PROGRESS NOTES
Assessment  1. Cervical radiculopathy    2. Cervical spinal stenosis    3. Lumbar radiculopathy      Patient presenting with chronic neck and low back pain for 30 years, worsened in the past several months.  Patient does endorse bilateral cervical radicular symptoms as well as more recently right lumbar radicular symptoms.  Pain is consistent with cervical and lumbar radicular pain accompanied by pain at times >7/10 on the pain scale with inability to participate in IADLs for >6 weeks.   Patient has participated with physical therapy and chiropractic therapy as well as home exercises and stretches.  Patient has tried Tylenol, NSAIDs, oral steroids, gabapentin with varying degrees of modest benefit.    Denies any bowel or bladder incontinence, saddle anesthesia.    Independently reviewed and interpreted cervical MRI and lumbar x-rays-lumbar x-ray showed moderate facet disease in the lower lumbar spine.  Cervical MRI shows mild stenosis at C5-6 and mild to moderate stenosis at C6-7 with severe bilateral foraminal narrowing at C5-6 and severe right greater than left foraminal narrowing at C6-7.    Patient has had spinal injections and interventions, most recently with Dr. Delacruz in 2019 with the following:  3/11/2019 - RFA right L3, L4, L5  2/11/2019 - bilateral L3, L4, L5 MBB  2/4/2019 - bilateral L3, L4, L5 MBB  1/21/2019 - C7-T1 ALEXANDRO    He also underwent multiple cervical and lumbar injections years ago in [place] with varying degrees of relief at that time.    Plan    Discussed and offered repeat C7-T1 AELXANDRO.   Risks, benefits, and alternatives to epidural steroid injections thoroughly discussed with patient.   Complete risks and benefits including bleeding, infection, tissue reaction, nerve injury and allergic reaction were discussed. The approach was demonstrated using models and literature was provided. Verbal consent was obtained.    Will obtain lumbar MRI for further evaluation given that his chronic low back symptoms  have progressed to right radicular leg symptoms.     Reviewed external notes from Orthopedics (4/15/24), PCP (10/12/23)  in regards to recent and prior relevant medical histories, treatment recommendations, medication and/or interventional treatment responses.    Reviewed hemoglobin A1c, renal function, CBC and/or PT/INR prior to discussing/offering interventional modalities.    [place] Prescription Drug Monitoring Program report was reviewed and was appropriate     My impressions and treatment recommendations were discussed in detail with the patient who verbalized understanding and had no further questions.  Discharge instructions were provided. I personally saw and examined the patient and I agree with the above discussed plan of care.    Orders Placed This Encounter   Procedures    MRI lumbar spine without contrast     Standing Status:   Future     Standing Expiration Date:   5/1/2028     Scheduling Instructions:      There is no preparation for this test. Please leave your jewelry and valuables at home, wedding rings are the exception. All patients will be required to change into a hospital gown and pants.  Street clothes are not permitted in the MRI.  Magnetic nail polish must be removed prior to arrival for your test. Please bring your insurance cards, a form of photo ID and a list of your medications with you. Arrive 15 minutes prior to your appointment time in order to register. Please bring any prior CT or MRI studies of this area that were not performed at a [facility].            To schedule this appointment, please contact Central Scheduling at [phone].            Prior to your appointment, please make sure you complete the MRI Screening Form when you e-Check in for your appointment. This will be available starting 7 days before your appointment in [employer]. You may receive an e-mail with an activation code if you do not have a [employer] account. If you do not have access to a device,  we will complete your screening at your appointment.     Order Specific Question:   What is the patient's sedation requirement?     Answer:   No Sedation     Order Specific Question:   Does this procedure require the 3T MRI at [place] or [place]?     Answer:   No     Order Specific Question:   [facility]     Answer:   Immediate     Order Specific Question:   Is order priority selected as STAT?     Answer:   No     Order Specific Question:   Reason for Exam     Answer:   chronic low back pain recently radiating to the right leg    FL spine and pain procedure     Standing Status:   Future     Standing Expiration Date:   5/1/2028     Order Specific Question:   Reason for Exam:     Answer:   C7-T1 ALEXANDRO     Order Specific Question:   Anticoagulant hold needed?     Answer:   no     No orders of the defined types were placed in this encounter.      History of Present Illness    Michael Mahmood is a 48 y.o. male presenting for consultation (referred by Nesha Kee PA-C) at [facility] for exam and evaluation of chronic neck and low back pain for 30 years, worsening over the past several months. Pain started following a non work related injuries. Over the past month, the intensity of pain has been Moderate to severe. Pain is currently 3/10. Pain does interfere with age appropriate activities of daily living. Pain is constant, worse at night. Pain is described as shooting, sharp with numbness and pins/needles. Patient denies weakness in the upper and lower extremities. Assistance device used: None.    Pain is increased with standing, bending, walking, exercise, sneezing.   Pain is decreased with lying down, rest pain.    Treatments tried:   Heat/ice  Physical therapy, chiropractic therapy  Injections: yes   Previous spine surgery: No    Anticoagulation: no    Medications tried:   Hydrocodone, oxycodone, tramadol, ibuprofen, naproxen, oral steroids, gabapentin    I have  personally reviewed and/or updated the patient's past medical history, past surgical history, family history, social history, current medications, allergies, and vital signs today.     Review of Systems   Constitutional:  Negative for unexpected weight change.   HENT:  Negative for hearing loss and sore throat.    Eyes:  Negative for pain and visual disturbance.   Respiratory:  Negative for shortness of breath and wheezing.    Cardiovascular:  Negative for leg swelling.   Gastrointestinal:  Negative for abdominal pain, nausea and vomiting.   Endocrine: Negative for polyphagia and polyuria.   Genitourinary:  Negative for difficulty urinating and hematuria.   Musculoskeletal:  Negative for joint swelling, myalgias and neck pain.   Skin:  Negative for rash.   Neurological:  Negative for weakness and numbness.   Hematological:  Does not bruise/bleed easily.   Psychiatric/Behavioral:  Negative for decreased concentration. The patient is not nervous/anxious.        Patient Active Problem List   Diagnosis    Hypertension    Generalized anxiety disorder    Low back pain without sciatica    Anxiety    Neck pain    Allergy to cats    History of positive PPD    Impaired fasting glucose    Chronic left shoulder pain    Gout    Acute right-sided thoracic back pain    Nonintractable headache    Exposure to COVID-19 virus    Dermatitis    Leukopenia    Obesity (BMI 30-39.9)    Cyanosis    Herpes zoster without complication       Past Medical History:   Diagnosis Date    Cervical disc disorder     c6,c7, epidural injections for pain management    Fatigue     Generalized anxiety disorder     pt denies    Hypertension     SOBOE (shortness of breath on exertion)     pt denies       Past Surgical History:   Procedure Laterality Date    ESOPHAGOGASTRODUODENOSCOPY      [place] ESOPHAGOGASTRODUODENOSCOPY TRANSORAL DIAGNOSTIC N/A 6/15/2018    Procedure: ESOPHAGOGASTRODUODENOSCOPY (EGD);  Surgeon: Ayan Boykin MD;  Location: [facility];   Service: Gastroenterology       Family History   Problem Relation Age of Onset    Hypertension Mother     Gout Father        Social History     Occupational History    Not on file   Tobacco Use    Smoking status: Never    Smokeless tobacco: Never   Vaping Use    Vaping status: Never Used   Substance and Sexual Activity    Alcohol use: Yes     Comment: Social    Drug use: No    Sexual activity: Yes       Current Outpatient Medications on File Prior to Visit   Medication Sig    olmesartan (BENICAR) 20 mg tablet TAKE 1 TABLET BY MOUTH EVERY DAY    omeprazole (PriLOSEC) 20 mg delayed release capsule TAKE 2 CAPSULES BY MOUTH TWICE A DAY (Patient taking differently: One pill every other day.)    benzonatate (TESSALON PERLES) 100 mg capsule Take 1 capsule (100 mg total) by mouth 3 (three) times a day as needed for cough (Patient not taking: Reported on 10/25/2023)    capsicum (ZOSTRIX) 0.075 % topical cream Apply topically 3 (three) times a day (Patient not taking: Reported on 5/1/2024)    fluocinonide (LIDEX) 0.05 % cream Apply 1 application topically 2 (two) times a day To affected area (Patient not taking: Reported on 8/25/2023)    gabapentin (Neurontin) 300 mg capsule Take 1 capsule (300 mg total) by mouth daily at bedtime Gabapentin may cause vision changes, clumsiness, unsteadiness, dizziness, drowsiness, sleepiness, or trouble with thinking. (Patient not taking: Reported on 5/1/2024)    Semaglutide-Weight Management (WEGOVY) 0.25 MG/0.5ML Inject 0.5 mL (0.25 mg total) under the skin once a week (Patient not taking: Reported on 5/1/2024)    valACYclovir (VALTREX) 1,000 mg tablet Take 1 tablet (1,000 mg total) by mouth 3 (three) times a day for 7 days     No current facility-administered medications on file prior to visit.       No Known Allergies    Physical Exam    /72   Ht 6' (1.829 m)   Wt 105 kg (231 lb)   BMI 31.33 kg/m²     Constitutional: normal, well developed, well nourished, alert, in no distress  and non-toxic and no overt pain behavior.  Eyes: anicteric  HEENT: grossly intact  Neck: supple, symmetric, trachea midline and no masses   Pulmonary:even and unlabored  Cardiovascular:No edema or pitting edema present  Skin:Normal without rashes or lesions and well hydrated  Psychiatric:Mood and affect appropriate  Neurologic: Motor function is grossly intact with no focal neurologic deficits   Musculoskeletal: Gait is nonantalgic    Imaging  MRI CERVICAL SPINE WITHOUT CONTRAST     INDICATION: M54.12: Radiculopathy, cervical region.     COMPARISON: 12/28/2018     TECHNIQUE:  Multiplanar, multisequence imaging of the cervical spine was performed. .        IMAGE QUALITY:  Diagnostic     FINDINGS:     ALIGNMENT: There is reversal of the normal cervical lordosis centered at C4.     MARROW SIGNAL:  Normal marrow signal is identified within the visualized bony structures.  No discrete marrow lesion. There is Modic type I endplate degenerative change at C6-C7.     CERVICAL AND VISUALIZED THORACIC CORD:  Normal signal within the visualized cord.     PREVERTEBRAL AND PARASPINAL SOFT TISSUES:  Normal.     VISUALIZED POSTERIOR FOSSA:  The visualized posterior fossa demonstrates no abnormal signal.     CERVICAL DISC SPACES:     C2-C3:  Normal.     C3-C4: There is left-sided uncovertebral spurring. There is facet arthrosis. There is mild left foraminal narrowing.     C4-C5: There is no significant canal stenosis or foraminal narrowing.     C5-C6: There is a disc osteophyte complex with uncovertebral spurring. There is mild canal stenosis with contact of the cord. There is severe bilateral foraminal narrowing.     C6-C7: There is a disc osteophyte complex with uncovertebral spurring. There is mild to moderate canal stenosis with contact of the cord. There is severe right greater than left foraminal narrowing.     C7-T1: There is a minimal bulge without significant canal stenosis or foraminal narrowing. There is a posterior  annular fissure.     UPPER THORACIC DISC SPACES:  Normal.     OTHER FINDINGS:  None.     IMPRESSION:     Multilevel cervical spondylosis, as described above and most notable at C6-C7.        Narrative & Impression         LUMBAR SPINE     INDICATION:   Pain, unspecified.      COMPARISON: 4/4/2024.     VIEWS:  XR SPINE LUMBAR 2 OR 3 VIEWS INJURY  Images: 2     FINDINGS:     There are 5 non rib bearing lumbar vertebral bodies.      There is no evidence of acute fracture or destructive osseous lesion.     Alignment is unremarkable. There is no change in flexion-extension views.     There is moderate facet disease in the lower lumbar spine. There is mild osteophytosis throughout the lumbar spine as well without disc space narrowing.   Soft tissues are unremarkable.     IMPRESSION:        No malalignment or change in flexion-extension views. Degenerative change in the lower lumbar spine..

## 2024-05-23 ENCOUNTER — HOSPITAL ENCOUNTER (OUTPATIENT)
Dept: RADIOLOGY | Facility: MEDICAL CENTER | Age: 49
End: 2024-05-23
Payer: COMMERCIAL

## 2024-05-23 VITALS
OXYGEN SATURATION: 95 % | DIASTOLIC BLOOD PRESSURE: 87 MMHG | HEART RATE: 95 BPM | TEMPERATURE: 97.8 F | SYSTOLIC BLOOD PRESSURE: 131 MMHG | RESPIRATION RATE: 20 BRPM

## 2024-05-23 DIAGNOSIS — M54.12 CERVICAL RADICULOPATHY: ICD-10-CM

## 2024-05-23 PROCEDURE — 62321 NJX INTERLAMINAR CRV/THRC: CPT | Performed by: ANESTHESIOLOGY

## 2024-05-23 RX ORDER — BUPIVACAINE HCL/PF 2.5 MG/ML
1 VIAL (ML) INJECTION ONCE
Status: COMPLETED | OUTPATIENT
Start: 2024-05-23 | End: 2024-05-23

## 2024-05-23 RX ORDER — METHYLPREDNISOLONE ACETATE 80 MG/ML
80 INJECTION, SUSPENSION INTRA-ARTICULAR; INTRALESIONAL; INTRAMUSCULAR; PARENTERAL; SOFT TISSUE ONCE
Status: COMPLETED | OUTPATIENT
Start: 2024-05-23 | End: 2024-05-23

## 2024-05-23 RX ADMIN — METHYLPREDNISOLONE ACETATE 80 MG: 80 INJECTION, SUSPENSION INTRA-ARTICULAR; INTRALESIONAL; INTRAMUSCULAR; PARENTERAL; SOFT TISSUE at 13:49

## 2024-05-23 RX ADMIN — BUPIVACAINE HYDROCHLORIDE 1 ML: 2.5 INJECTION, SOLUTION EPIDURAL; INFILTRATION; INTRACAUDAL at 13:49

## 2024-05-23 RX ADMIN — IOHEXOL 1 ML: 300 INJECTION, SOLUTION INTRAVENOUS at 13:48

## 2024-05-23 NOTE — H&P
History of Present Illness: The patient is a 48 y.o. male who presents with complaints of neck and arm pain    Past Medical History:   Diagnosis Date    Cervical disc disorder     c6,c7, epidural injections for pain management    Fatigue     Generalized anxiety disorder     pt denies    Hypertension     SOBOE (shortness of breath on exertion)     pt denies       Past Surgical History:   Procedure Laterality Date    ESOPHAGOGASTRODUODENOSCOPY      [place] ESOPHAGOGASTRODUODENOSCOPY TRANSORAL DIAGNOSTIC N/A 6/15/2018    Procedure: ESOPHAGOGASTRODUODENOSCOPY (EGD);  Surgeon: Ayan Boykin MD;  Location: [facility];  Service: Gastroenterology         Current Outpatient Medications:     olmesartan (BENICAR) 20 mg tablet, TAKE 1 TABLET BY MOUTH EVERY DAY, Disp: 90 tablet, Rfl: 0    omeprazole (PriLOSEC) 20 mg delayed release capsule, TAKE 2 CAPSULES BY MOUTH TWICE A DAY (Patient taking differently: One pill every other day.), Disp: 360 capsule, Rfl: 3    Current Facility-Administered Medications:     bupivacaine (PF) (MARCAINE) 0.25 % injection 1 mL, 1 mL, Epidural, Once, Will Heidi Cruz MD    iohexol (OMNIPAQUE) 300 mg/mL injection 1 mL, 1 mL, Epidural, Once, Will Heidi Cruz MD    methylPREDNISolone acetate (DEPO-MEDROL) injection 80 mg, 80 mg, Epidural, Once, Will Heidi Cruz MD    No Known Allergies    Physical Exam:   Vitals:    05/23/24 1334   BP: 147/80   Pulse: 68   Resp: 20   Temp: 97.8 °F (36.6 °C)   SpO2: 96%     General: Awake, Alert, Oriented x 3, Mood and affect appropriate  Respiratory: Respirations even and unlabored  Cardiovascular: Peripheral pulses intact; no edema  Musculoskeletal Exam: neck and arm pain    ASA Score: 2    Patient/Chart Verification  Patient ID Verified: Verbal  ID Band Applied: No  Consents Confirmed: Procedural, To be obtained in the Pre-Procedure area  H&P( within 30 days) Verified: To be obtained in the Pre-Procedure area  Interval H&P(within 24 hr) Complete (required for  Outpatients and Surgery Admit only): To be obtained in the Pre-Procedure area  Allergies Reviewed: Yes  Anticoag/NSAID held?: Yes (no motrin in the last 24 hours)  Currently on antibiotics?: No    Assessment:   1. Cervical radiculopathy        Plan: C7-T1 ALEXANDRO

## 2024-05-23 NOTE — DISCHARGE INSTRUCTIONS
Epidural Steroid Injection   WHAT YOU NEED TO KNOW:   An epidural steroid injection (VINICIUS) is a procedure to inject steroid medicine into the epidural space. The epidural space is between your spinal cord and vertebrae. Steroids reduce inflammation and fluid buildup in your spine that may be causing pain. You may be given pain medicine along with the steroids.          ACTIVITY  Do not drive or operate machinery today.  No strenuous activity today - bending, lifting, etc.  You may resume normal activites starting tomorrow - start slowly and as tolerated.  You may shower today, but no tub baths or hot tubs.  You may have numbness for several hours from the local anesthetic. Please use caution and common sense, especially with weight-bearing activities.    CARE OF THE INJECTION SITE  If you have soreness or pain, apply ice to the area today (20 minutes on/20 minutes off).  Starting tomorrow, you may use warm, moist heat or ice if needed.  You may have an increase or change in your discomfort for 36-48 hours after your treatment.  Apply ice and continue with any pain medication you have been prescribed.  Notify the [facility] if you have any of the following: redness, drainage, swelling, headache, stiff neck or fever above 100°F.    SPECIAL INSTRUCTIONS  Our office will contact you in approximately 7 days for a progress report.    MEDICATIONS  Continue to take all routine medications.  Our office may have instructed you to hold some medications.    As no general anesthesia was used in today's procedure, you should not experience any side effects related to anesthesia.     If you are diabetic, the steroids used in today's injection may temporarily increase your blood sugar levels after the first few days after your injection. Please keep a close eye on your sugars and alert the doctor who manages your diabetes if your sugars are significantly high from your baseline or you are symptomatic.     If you have a  problem specifically related to your procedure, please call our office at [phone].  Problems not related to your procedure should be directed to your primary care physician.

## 2024-05-24 ENCOUNTER — HOSPITAL ENCOUNTER (OUTPATIENT)
Dept: MRI IMAGING | Facility: HOSPITAL | Age: 49
End: 2024-05-24
Attending: ANESTHESIOLOGY
Payer: COMMERCIAL

## 2024-05-24 DIAGNOSIS — M54.16 LUMBAR RADICULOPATHY: ICD-10-CM

## 2024-05-24 PROCEDURE — 72148 MRI LUMBAR SPINE W/O DYE: CPT

## 2024-05-30 ENCOUNTER — TELEPHONE (OUTPATIENT)
Dept: PAIN MEDICINE | Facility: CLINIC | Age: 49
End: 2024-05-30

## 2024-06-03 NOTE — TELEPHONE ENCOUNTER
Caller: Michael  Doctor/office: Nancy PERALTA#: [phone]    % of improvement: 70%  Pain Scale (1-10): 0/10

## 2024-06-13 ENCOUNTER — TELEPHONE (OUTPATIENT)
Age: 49
End: 2024-06-13

## 2024-06-13 NOTE — TELEPHONE ENCOUNTER
Caller: Michael     Doctor: Nancy     Reason for call: Patient calling asking for a call back regarding MRI results and next steps please advise     Call back#: [phone]

## 2024-06-14 NOTE — TELEPHONE ENCOUNTER
Attempted to reach pt, LMOM with CB# and OH.    Will Heidi Cruz MD  [facility] hour ago (7:16 AM)       No significant findings. There is no disc herniation or spinal canal stenosis. There is a small disc bulge (not herniation) at L4-5. If he has symptomatic back and right radicular leg pain I can offer him a right L4-5 LESI.     You  Will Hedii Cruz MD16 hours ago (4:25 PM)     VO  Please see previous and advise.

## 2024-06-14 NOTE — TELEPHONE ENCOUNTER
S/w pt and advised of the same, pt would like to proceed with injection as he has pain into right buttock and right upper posterior thigh.  Pt denies blood thinners and denies diabetes.  Nurse advised pt SPA  to reach out to pt to schedule.  Pt verbalized understanding and appreciative of call.

## 2024-06-14 NOTE — TELEPHONE ENCOUNTER
Patient is scheduled for 8/1, follow up is scheduled for 8/30.      Reviewed instructions: , NPO 1 hour prior, loose-fitting/comfortable pants, if ill/fever/infx/abx to call and reschedule. No immunizations or vaccinations 2w prior/after steroid injections.        Patient stated verbal understanding.

## 2024-06-15 DIAGNOSIS — I10 HYPERTENSION, UNSPECIFIED TYPE: ICD-10-CM

## 2024-06-15 RX ORDER — OLMESARTAN MEDOXOMIL 20 MG/1
20 TABLET ORAL DAILY
Qty: 90 TABLET | Refills: 1 | Status: SHIPPED | OUTPATIENT
Start: 2024-06-15

## 2024-06-25 ENCOUNTER — TELEPHONE (OUTPATIENT)
Dept: PAIN MEDICINE | Facility: CLINIC | Age: 49
End: 2024-06-25

## 2024-06-25 NOTE — TELEPHONE ENCOUNTER
Lvm for patient letting him know I sent a [employer] message as well as reiterated the dates and times we had available as of 6/25 @ 8:22am.     When patient calls back, please schedule appropriately.

## 2024-07-09 DIAGNOSIS — K22.2 ESOPHAGEAL STRICTURE: ICD-10-CM

## 2024-07-10 ENCOUNTER — TELEPHONE (OUTPATIENT)
Age: 49
End: 2024-07-10

## 2024-07-10 RX ORDER — OMEPRAZOLE 20 MG/1
CAPSULE, DELAYED RELEASE ORAL
Qty: 360 CAPSULE | Refills: 3 | OUTPATIENT
Start: 2024-07-10

## 2024-07-11 ENCOUNTER — APPOINTMENT (OUTPATIENT)
Dept: LAB | Facility: CLINIC | Age: 49
End: 2024-07-11
Payer: COMMERCIAL

## 2024-07-11 ENCOUNTER — OFFICE VISIT (OUTPATIENT)
Dept: FAMILY MEDICINE CLINIC | Facility: CLINIC | Age: 49
End: 2024-07-11
Payer: COMMERCIAL

## 2024-07-11 VITALS
BODY MASS INDEX: 28.85 KG/M2 | TEMPERATURE: 97 F | OXYGEN SATURATION: 95 % | SYSTOLIC BLOOD PRESSURE: 120 MMHG | RESPIRATION RATE: 18 BRPM | DIASTOLIC BLOOD PRESSURE: 86 MMHG | WEIGHT: 213 LBS | HEIGHT: 72 IN | HEART RATE: 75 BPM

## 2024-07-11 DIAGNOSIS — Z02.83 ENCOUNTER FOR DRUG SCREENING: ICD-10-CM

## 2024-07-11 DIAGNOSIS — Z01.84 IMMUNITY STATUS TESTING: Primary | ICD-10-CM

## 2024-07-11 DIAGNOSIS — Z11.1 TUBERCULOSIS SCREENING: ICD-10-CM

## 2024-07-11 DIAGNOSIS — K22.2 ESOPHAGEAL STRICTURE: ICD-10-CM

## 2024-07-11 DIAGNOSIS — Z01.84 IMMUNITY STATUS TESTING: ICD-10-CM

## 2024-07-11 PROCEDURE — 80307 DRUG TEST PRSMV CHEM ANLYZR: CPT

## 2024-07-11 PROCEDURE — 86706 HEP B SURFACE ANTIBODY: CPT

## 2024-07-11 PROCEDURE — 36415 COLL VENOUS BLD VENIPUNCTURE: CPT

## 2024-07-11 PROCEDURE — 99213 OFFICE O/P EST LOW 20 MIN: CPT | Performed by: FAMILY MEDICINE

## 2024-07-11 PROCEDURE — 80349 CANNABINOIDS NATURAL: CPT

## 2024-07-11 PROCEDURE — 86704 HEP B CORE ANTIBODY TOTAL: CPT

## 2024-07-11 RX ORDER — OMEPRAZOLE 20 MG/1
20 CAPSULE, DELAYED RELEASE ORAL DAILY
Qty: 90 CAPSULE | Refills: 1 | Status: SHIPPED | OUTPATIENT
Start: 2024-07-11

## 2024-07-11 NOTE — ASSESSMENT & PLAN NOTE
Esophageal stricture.  Patient was given a refill of his omeprazole 20 mg to use once daily as ordered.

## 2024-07-11 NOTE — ASSESSMENT & PLAN NOTE
Immunity testing.  Patient will have testing for hepatitis B immunity as well as testing for tuberculosis with a quantiferon gold test

## 2024-07-11 NOTE — PROGRESS NOTES
FAMILY PRACTICE OFFICE VISIT       NAME: Michael Mahmood  AGE: 48 y.o. SEX: male       : 1975        MRN: [id number]    DATE: 2024  TIME: 12:29 PM    Assessment and Plan     Problem List Items Addressed This Visit       Immunity status testing - Primary     Immunity testing.  Patient will have testing for hepatitis B immunity as well as testing for tuberculosis with a quantiferon gold test         Relevant Orders    Hepatitis B Immunity Panel    Tuberculosis screening    Relevant Orders    Quantiferon TB Gold Plus Assay    Esophageal stricture     Esophageal stricture.  Patient was given a refill of his omeprazole 20 mg to use once daily as ordered.         Relevant Medications    omeprazole (PriLOSEC) 20 mg delayed release capsule    Encounter for drug screening     Encounter for drug screening.  Patient was sent to the laboratory for urine drug screen as recommended by his employer         Relevant Orders    Drug Screen Routine w /Conf and Adulteration, urine.           Chief Complaint     Chief Complaint   Patient presents with    TB Test     B/w drug test , hep b vaccine for work        History of Present Illness     Patient in the office with request for refill of his omeprazole.  Patient has a history of GERD and esophageal stricture and uses omeprazole 20 mg every other day or as needed to control his symptoms.    Patient also had request to test for tuberculosis screening as well as drug screen as requested by his employer.  Patient states in the past several years ago he had been on antibiotics for a positive PPD test medications for close to a year.  Patient denies any chest pain, fevers, coughing.    Patient exercises on a regular basis several days a week and has been able to lose some weight over the past year with diet and exercise.  Patient is a non-smoker        Review of Systems   Review of Systems   Constitutional: Negative.    Respiratory: Negative.     Cardiovascular: Negative.     Gastrointestinal: Negative.    Genitourinary: Negative.    Musculoskeletal: Negative.        Active Problem List     Patient Active Problem List   Diagnosis    Hypertension    Generalized anxiety disorder    Low back pain without sciatica    Anxiety    Neck pain    Allergy to cats    History of positive PPD    Impaired fasting glucose    Chronic left shoulder pain    Gout    Acute right-sided thoracic back pain    Nonintractable headache    Exposure to COVID-19 virus    Dermatitis    Leukopenia    Obesity (BMI 30-39.9)    Cyanosis    Herpes zoster without complication    Cervical radiculopathy    Immunity status testing    Tuberculosis screening    Esophageal stricture    Encounter for drug screening       Past Medical History:  Past Medical History:   Diagnosis Date    Cervical disc disorder     c6,c7, epidural injections for pain management    Fatigue     Generalized anxiety disorder     pt denies    Hypertension     SOBOE (shortness of breath on exertion)     pt denies       Past Surgical History:  Past Surgical History:   Procedure Laterality Date    ESOPHAGOGASTRODUODENOSCOPY      [place] ESOPHAGOGASTRODUODENOSCOPY TRANSORAL DIAGNOSTIC N/A 6/15/2018    Procedure: ESOPHAGOGASTRODUODENOSCOPY (EGD);  Surgeon: Ayan Boykin MD;  Location: [facility];  Service: Gastroenterology       Family History:  Family History   Problem Relation Age of Onset    Hypertension Mother     Gout Father        Social History:  Social History     Socioeconomic History    Marital status: /Civil Union     Spouse name: Not on file    Number of children: Not on file    Years of education: Not on file    Highest education level: Not on file   Occupational History    Not on file   Tobacco Use    Smoking status: Never    Smokeless tobacco: Never   Vaping Use    Vaping status: Never Used   Substance and Sexual Activity    Alcohol use: Yes     Comment: Social    Drug use: No    Sexual activity: Yes   Other Topics Concern    Not on file    Social History Narrative    Exercises regularly     Social Determinants of Health     Financial Resource Strain: Not on file   Food Insecurity: Not on file   Transportation Needs: Not on file   Physical Activity: Not on file   Stress: Not on file   Social Connections: Not on file   Intimate Partner Violence: Not on file   Housing Stability: Not on file       Objective     Vitals:    07/11/24 1045   BP: 120/86   Pulse: 75   Resp: 18   Temp: (!) 97 °F (36.1 °C)   SpO2: 95%     Wt Readings from Last 3 Encounters:   07/11/24 96.6 kg (213 lb)   05/01/24 105 kg (231 lb)   04/15/24 106 kg (233 lb 11 oz)       Physical Exam  Constitutional:       General: He is not in acute distress.     Appearance: Normal appearance. He is not ill-appearing.   HENT:      Head: Normocephalic and atraumatic.   Eyes:      General:         Right eye: No discharge.         Left eye: No discharge.      Extraocular Movements: Extraocular movements intact.      Conjunctiva/sclera: Conjunctivae normal.      Pupils: Pupils are equal, round, and reactive to light.   Neck:      Vascular: No carotid bruit.   Cardiovascular:      Rate and Rhythm: Normal rate and regular rhythm.      Heart sounds: Normal heart sounds. No murmur heard.  Pulmonary:      Effort: Pulmonary effort is normal.      Breath sounds: Normal breath sounds. No wheezing, rhonchi or rales.   Abdominal:      General: Abdomen is flat. Bowel sounds are normal. There is no distension.      Palpations: Abdomen is soft.      Tenderness: There is no abdominal tenderness. There is no guarding or rebound.   Musculoskeletal:      Right lower leg: No edema.      Left lower leg: No edema.   Lymphadenopathy:      Cervical: No cervical adenopathy.   Skin:     Findings: No rash.   Neurological:      General: No focal deficit present.      Mental Status: He is alert and oriented to person, place, and time.      Cranial Nerves: No cranial nerve deficit.   Psychiatric:         Mood and Affect: Mood  "normal.         Behavior: Behavior normal.         Thought Content: Thought content normal.         Judgment: Judgment normal.         Pertinent Laboratory/Diagnostic Studies:  Lab Results   Component Value Date    GLUCOSE 106 (H) 02/27/2017    BUN 17 08/25/2023    CREATININE 1.07 08/25/2023    CALCIUM 9.6 08/25/2023     02/27/2017    K 4.5 08/25/2023    CO2 28 08/25/2023    CL 99 08/25/2023     Lab Results   Component Value Date    ALT 25 08/25/2023    AST 22 08/25/2023    ALKPHOS 43 08/25/2023    BILITOT 0.4 02/27/2017       Lab Results   Component Value Date    WBC 4.08 (L) 08/25/2023    HGB 16.6 08/25/2023    HCT 50.9 (H) 08/25/2023    MCV 98 08/25/2023     08/25/2023       No results found for: \"TSH\"    Lab Results   Component Value Date    CHOL 184 02/27/2017     Lab Results   Component Value Date    TRIG 186 (H) 08/25/2023     Lab Results   Component Value Date    HDL 46 08/25/2023     Lab Results   Component Value Date    LDLCALC 112 (H) 08/25/2023     No results found for: \"HGBA1C\"    Results for orders placed or performed in visit on 08/25/23   CBC   Result Value Ref Range    WBC 4.08 (L) 4.31 - 10.16 Thousand/uL    RBC 5.22 3.88 - 5.62 Million/uL    Hemoglobin 16.6 12.0 - 17.0 g/dL    Hematocrit 50.9 (H) 36.5 - 49.3 %    MCV 98 82 - 98 fL    MCH 31.8 26.8 - 34.3 pg    MCHC 32.6 31.4 - 37.4 g/dL    RDW 12.7 11.6 - 15.1 %    Platelets 199 149 - 390 Thousands/uL    MPV 10.1 8.9 - 12.7 fL   Comprehensive metabolic panel   Result Value Ref Range    Sodium 137 135 - 147 mmol/L    Potassium 4.5 3.5 - 5.3 mmol/L    Chloride 99 96 - 108 mmol/L    CO2 28 21 - 32 mmol/L    ANION GAP 10 mmol/L    BUN 17 5 - 25 mg/dL    Creatinine 1.07 0.60 - 1.30 mg/dL    Glucose, Fasting 95 65 - 99 mg/dL    Calcium 9.6 8.4 - 10.2 mg/dL    AST 22 13 - 39 U/L    ALT 25 7 - 52 U/L    Alkaline Phosphatase 43 34 - 104 U/L    Total Protein 7.3 6.4 - 8.4 g/dL    Albumin 4.7 3.5 - 5.0 g/dL    Total Bilirubin 0.95 0.20 - 1.00 " mg/dL    eGFR 82 ml/min/1.73sq m   Lipid panel   Result Value Ref Range    Cholesterol 195 See Comment mg/dL    Triglycerides 186 (H) See Comment mg/dL    HDL, Direct 46 >=40 mg/dL    LDL Calculated 112 (H) 0 - 100 mg/dL    Non-HDL-Chol (CHOL-HDL) 149 mg/dl   TSH, 3rd generation   Result Value Ref Range    TSH 3RD GENERATON 1.744 0.450 - 4.500 uIU/mL   Quantiferon TB Gold Plus   Result Value Ref Range    QFT Nil 0.04 0 - 8.0 IU/ml    QFT TB1-NIL 0.02 IU/ml    QFT TB2-NIL -0.01 IU/ml    QFT Mitogen-NIL >10.00 IU/ml    QFT Final Interpretation Negative Negative       Orders Placed This Encounter   Procedures    Hepatitis B Immunity Panel    Drug Screen Routine w /Conf and Adulteration, urine.       ALLERGIES:  No Known Allergies    Current Medications     Current Outpatient Medications   Medication Sig Dispense Refill    olmesartan (BENICAR) 20 mg tablet TAKE 1 TABLET BY MOUTH EVERY DAY 90 tablet 1    omeprazole (PriLOSEC) 20 mg delayed release capsule TAKE 2 CAPSULES BY MOUTH TWICE A DAY (Patient taking differently: One pill every other day.) 360 capsule 3    omeprazole (PriLOSEC) 20 mg delayed release capsule Take 1 capsule (20 mg total) by mouth daily 90 capsule 1     No current facility-administered medications for this visit.         Health Maintenance     Health Maintenance   Topic Date Due    Hepatitis C Screening  Never done    HIV Screening  Never done    COVID-19 Vaccine (6 - 2023-24 season) 09/01/2023    Annual Physical  08/25/2024    Influenza Vaccine (1) 09/01/2024    Depression Screening  07/11/2025    Zoster Vaccine (1 of 2) 09/28/2025    DTaP,Tdap,and Td Vaccines (8 - Td or Tdap) 02/15/2027    Colorectal Cancer Screening  11/22/2032    RSV Vaccine Age 60+ Years (1 - 1-dose 60+ series) 09/28/2035    IPV Vaccine  Completed    RSV Vaccine age 0-20 Months  Aged Out    Pneumococcal Vaccine: Pediatrics (0 to 5 Years) and At-Risk Patients (6 to 64 Years)  Aged Out    HIB Vaccine  Aged Out    Hepatitis A  Vaccine  Aged Out    Meningococcal ACWY Vaccine  Aged Out    HPV Vaccine  Aged Out     Immunization History   Administered Date(s) Administered    COVID-19 MODERNA VACC 0.5 ML IM 01/29/2021, 02/26/2021, 10/26/2021    COVID-19, unspecified 01/29/2021, 02/26/2021    DT (pediatric) 07/19/1990    DTP 1975, 02/13/1976, 05/18/1976, 12/21/1976, 06/03/1981    Hep B, Adolescent or Pediatric 04/01/1993, 05/08/1993, 05/06/1994    INFLUENZA 11/06/2018, 02/10/2021, 10/01/2021, 10/10/2022, 11/10/2023    Influenza, injectable, quadrivalent, preservative free 0.5 mL 11/06/2018, 09/20/2019    MMR 09/29/1976, 06/22/1982    OPV 01/13/1976, 03/17/1976, 06/22/1976, 04/04/1977    Tdap 06/16/2006, 02/15/2017       Zachary Nuno MD

## 2024-07-11 NOTE — ASSESSMENT & PLAN NOTE
Encounter for drug screening.  Patient was sent to the laboratory for urine drug screen as recommended by his employer   yes

## 2024-07-12 LAB
HBV CORE AB SER QL: NORMAL
HBV SURFACE AB SER-ACNC: >500 MIU/ML

## 2024-07-16 LAB
6MAM UR QL SCN: NEGATIVE NG/ML
ACCEPTABLE CREAT UR QL: 38 MG/DL
AMPHET UR QL SCN: NEGATIVE NG/ML
BARBITURATES UR QL SCN: NEGATIVE NG/ML
BENZODIAZ UR QL SCN: NEGATIVE NG/ML
BUPRENORPHINE UR QL CFM: NEGATIVE NG/ML
CANNABINOIDS UR QL SCN: ABNORMAL NG/ML
CANNABINOIDS/CREAT UR: 55 NG/MG CREAT
CARISOPRODOL UR QL: NEGATIVE NG/ML
COCAINE+BZE UR QL SCN: NEGATIVE NG/ML
ETHYL GLUCURONIDE UR QL SCN: NEGATIVE NG/ML
FENTANYL UR QL SCN: NEGATIVE NG/ML
GABAPENTIN SERPLBLD QL SCN: NEGATIVE UG/ML
METHADONE UR QL SCN: NEGATIVE NG/ML
NITRITE UR QL STRIP: NEGATIVE UG/ML
OPIATES UR QL SCN: NEGATIVE NG/ML
OXYCODONE+OXYMORPHONE UR QL SCN: NEGATIVE NG/ML
PCP UR QL SCN: NEGATIVE NG/ML
PROPOXYPH UR QL SCN: NEGATIVE NG/ML
SPECIMEN PH ACCEPTABLE UR: 6 (ref 4.5–8.9)
TAPENTADOL UR QL SCN: NEGATIVE NG/ML
TRAMADOL UR QL SCN: NEGATIVE NG/ML

## 2024-07-22 ENCOUNTER — TELEPHONE (OUTPATIENT)
Dept: PAIN MEDICINE | Facility: CLINIC | Age: 49
End: 2024-07-22

## 2024-07-23 NOTE — TELEPHONE ENCOUNTER
Caller: nicolle Rodriguez    Doctor: Nancy    Reason for call: pt called to r/s his OV.  Pt stated he is out of town most of the time for business and is asking if his follow up appt can be virtual    Call back#: [phone]

## 2024-08-10 PROBLEM — Z11.1 TUBERCULOSIS SCREENING: Status: RESOLVED | Noted: 2024-07-11 | Resolved: 2024-08-10

## 2024-08-13 ENCOUNTER — APPOINTMENT (OUTPATIENT)
Dept: LAB | Facility: CLINIC | Age: 49
End: 2024-08-13
Payer: COMMERCIAL

## 2024-08-13 PROCEDURE — 86480 TB TEST CELL IMMUN MEASURE: CPT

## 2024-08-14 LAB
GAMMA INTERFERON BACKGROUND BLD IA-ACNC: 0.03 IU/ML
M TB IFN-G BLD-IMP: NEGATIVE
M TB IFN-G CD4+ BCKGRND COR BLD-ACNC: 0.08 IU/ML
M TB IFN-G CD4+ BCKGRND COR BLD-ACNC: 0.14 IU/ML
MITOGEN IGNF BCKGRD COR BLD-ACNC: 9.97 IU/ML

## 2024-08-15 ENCOUNTER — TELEPHONE (OUTPATIENT)
Dept: FAMILY MEDICINE CLINIC | Facility: CLINIC | Age: 49
End: 2024-08-15

## 2024-08-15 NOTE — TELEPHONE ENCOUNTER
----- Message from Zachary Nuno MD sent at 8/15/2024  6:37 AM EDT -----  Patient's tuberculosis screening blood work was negative

## 2024-12-11 DIAGNOSIS — I10 HYPERTENSION, UNSPECIFIED TYPE: ICD-10-CM

## 2024-12-12 RX ORDER — OLMESARTAN MEDOXOMIL 20 MG/1
20 TABLET ORAL DAILY
Qty: 90 TABLET | Refills: 0 | Status: SHIPPED | OUTPATIENT
Start: 2024-12-12

## 2025-01-02 DIAGNOSIS — K22.2 ESOPHAGEAL STRICTURE: ICD-10-CM

## 2025-03-08 DIAGNOSIS — I10 HYPERTENSION, UNSPECIFIED TYPE: ICD-10-CM

## 2025-03-10 RX ORDER — OLMESARTAN MEDOXOMIL 20 MG/1
20 TABLET ORAL DAILY
Qty: 90 TABLET | Refills: 0 | Status: SHIPPED | OUTPATIENT
Start: 2025-03-10

## 2025-03-19 ENCOUNTER — TELEPHONE (OUTPATIENT)
Age: 50
End: 2025-03-19

## 2025-03-19 NOTE — TELEPHONE ENCOUNTER
Pt called for letter from Dr Nuno that states that he completed a 10 panel drug screen on 7/11/24. Pt needs it for work. Pt would like to access that on his mychart.

## 2025-03-19 NOTE — TELEPHONE ENCOUNTER
Patient may have a letter stating that he completed a drug screen on the date he mentioned however we cannot say that it was normal since he had presence of cannabis on test.  Patient may  copy of test if he desires

## 2025-04-17 DIAGNOSIS — Z02.83 ENCOUNTER FOR DRUG SCREENING: Primary | ICD-10-CM

## 2025-05-01 ENCOUNTER — OFFICE VISIT (OUTPATIENT)
Dept: FAMILY MEDICINE CLINIC | Facility: CLINIC | Age: 50
End: 2025-05-01
Payer: COMMERCIAL

## 2025-05-01 VITALS
HEIGHT: 72 IN | RESPIRATION RATE: 18 BRPM | BODY MASS INDEX: 30.09 KG/M2 | TEMPERATURE: 97.6 F | WEIGHT: 222.13 LBS | DIASTOLIC BLOOD PRESSURE: 80 MMHG | OXYGEN SATURATION: 98 % | HEART RATE: 80 BPM | SYSTOLIC BLOOD PRESSURE: 120 MMHG

## 2025-05-01 DIAGNOSIS — Z00.00 WELL ADULT EXAM: Primary | ICD-10-CM

## 2025-05-01 DIAGNOSIS — I10 PRIMARY HYPERTENSION: ICD-10-CM

## 2025-05-01 DIAGNOSIS — R35.1 NOCTURIA: ICD-10-CM

## 2025-05-01 PROCEDURE — 99396 PREV VISIT EST AGE 40-64: CPT | Performed by: FAMILY MEDICINE

## 2025-05-01 NOTE — PROGRESS NOTES
FAMILY PRACTICE OFFICE VISIT       NAME: Michael Mahmood  AGE: 49 y.o. SEX: male       : 1975        MRN: [id number]    DATE: 2025  TIME: 9:22 AM    Assessment and Plan     Problem List Items Addressed This Visit       Hypertension    Hypertension.  The patient's blood pressure is stable at this time and he will continue current regimen of medications         Well adult exam - Primary    Well adult.  Overall the patient appears to be in stable health.  He will obtain blood work as ordered for further evaluation.  We will make further recommendations pending results of test.         Relevant Orders    CBC    Comprehensive metabolic panel    Lipid panel    TSH, 3rd generation    PSA, Total Screen     Other Visit Diagnoses         Nocturia        Relevant Orders    PSA, Total Screen                Chief Complaint     Chief Complaint   Patient presents with    Well Check     Physical        History of Present Illness     Patient in the office for annual wellness exam.  Patient denies any recent illness.  He still exercises several days a week on a stationary bicycle.  He does struggle with intermittent cervical neck pain from degenerative joint disease.  Patient has seen orthopedist and pain management with mild relief in symptoms with epidural injections.  He does perform his usual physical therapy exercises.        Review of Systems   Review of Systems   Constitutional: Negative.    HENT: Negative.     Eyes: Negative.    Respiratory: Negative.     Cardiovascular: Negative.    Gastrointestinal: Negative.    Genitourinary: Negative.    Musculoskeletal: Negative.    Skin: Negative.    Neurological: Negative.    Psychiatric/Behavioral: Negative.         Active Problem List     Patient Active Problem List   Diagnosis    Hypertension    Generalized anxiety disorder    Low back pain without sciatica    Anxiety    Neck pain    Allergy to cats    History of positive PPD    Impaired fasting glucose    Chronic left  shoulder pain    Gout    Acute right-sided thoracic back pain    Nonintractable headache    Exposure to COVID-19 virus    Dermatitis    Leukopenia    Obesity (BMI 30-39.9)    Cyanosis    Herpes zoster without complication    Cervical radiculopathy    Immunity status testing    Esophageal stricture    Encounter for drug screening    Well adult exam       Past Medical History:  Past Medical History:   Diagnosis Date    Cervical disc disorder     c6,c7, epidural injections for pain management    Fatigue     Generalized anxiety disorder     pt denies    Hypertension     SOBOE (shortness of breath on exertion)     pt denies       Past Surgical History:  Past Surgical History:   Procedure Laterality Date    ESOPHAGOGASTRODUODENOSCOPY      TN ESOPHAGOGASTRODUODENOSCOPY TRANSORAL DIAGNOSTIC N/A 6/15/2018    Procedure: ESOPHAGOGASTRODUODENOSCOPY (EGD);  Surgeon: Ayan Boykin MD;  Location: [facility];  Service: Gastroenterology       Family History:  Family History   Problem Relation Age of Onset    Hypertension Mother     Gout Father        Social History:  Social History     Socioeconomic History    Marital status: /Civil Union     Spouse name: Not on file    Number of children: Not on file    Years of education: Not on file    Highest education level: Not on file   Occupational History    Not on file   Tobacco Use    Smoking status: Never    Smokeless tobacco: Never   Vaping Use    Vaping status: Never Used   Substance and Sexual Activity    Alcohol use: Yes     Comment: Social    Drug use: No    Sexual activity: Yes   Other Topics Concern    Not on file   Social History Narrative    Exercises regularly     Social Drivers of Health     Financial Resource Strain: Not on file   Food Insecurity: No Food Insecurity (4/28/2025)    Hunger Vital Sign     Worried About Running Out of Food in the Last Year: Never true     Ran Out of Food in the Last Year: Never true   Transportation Needs: No Transportation Needs  (4/28/2025)    PRAPARE - Transportation     Lack of Transportation (Medical): No     Lack of Transportation (Non-Medical): No   Physical Activity: Not on file   Stress: Not on file   Social Connections: Not on file   Intimate Partner Violence: Not on file   Housing Stability: Unknown (4/28/2025)    Housing Stability Vital Sign     Unable to Pay for Housing in the Last Year: No     Number of Times Moved in the Last Year: Not on file     Homeless in the Last Year: No       Objective     Vitals:    05/01/25 0922   BP: 120/80   Pulse:    Resp:    Temp:    SpO2:      Wt Readings from Last 3 Encounters:   05/01/25 101 kg (222 lb 2 oz)   07/11/24 96.6 kg (213 lb)   05/01/24 105 kg (231 lb)       Physical Exam  Constitutional:       General: He is not in acute distress.     Appearance: Normal appearance. He is not ill-appearing.   HENT:      Head: Normocephalic and atraumatic.      Right Ear: Tympanic membrane, ear canal and external ear normal. There is no impacted cerumen.      Left Ear: Tympanic membrane, ear canal and external ear normal. There is no impacted cerumen.   Eyes:      General:         Right eye: No discharge.         Left eye: No discharge.      Extraocular Movements: Extraocular movements intact.      Conjunctiva/sclera: Conjunctivae normal.      Pupils: Pupils are equal, round, and reactive to light.   Neck:      Vascular: No carotid bruit.   Cardiovascular:      Rate and Rhythm: Normal rate and regular rhythm.      Heart sounds: Normal heart sounds. No murmur heard.  Pulmonary:      Effort: Pulmonary effort is normal.      Breath sounds: Normal breath sounds. No wheezing, rhonchi or rales.   Abdominal:      General: Abdomen is flat. Bowel sounds are normal. There is no distension.      Palpations: Abdomen is soft.      Tenderness: There is no abdominal tenderness. There is no guarding or rebound.   Musculoskeletal:      Right lower leg: No edema.      Left lower leg: No edema.   Lymphadenopathy:       "Cervical: No cervical adenopathy.   Skin:     Findings: No rash.   Neurological:      General: No focal deficit present.      Mental Status: He is alert and oriented to person, place, and time.      Cranial Nerves: No cranial nerve deficit.   Psychiatric:         Mood and Affect: Mood normal.         Behavior: Behavior normal.         Thought Content: Thought content normal.         Judgment: Judgment normal.         Pertinent Laboratory/Diagnostic Studies:  Lab Results   Component Value Date    GLUCOSE 106 (H) 02/27/2017    BUN 17 08/25/2023    CREATININE 1.07 08/25/2023    CALCIUM 9.6 08/25/2023     02/27/2017    K 4.5 08/25/2023    CO2 28 08/25/2023    CL 99 08/25/2023     Lab Results   Component Value Date    ALT 25 08/25/2023    AST 22 08/25/2023    ALKPHOS 43 08/25/2023    BILITOT 0.4 02/27/2017       Lab Results   Component Value Date    WBC 4.08 (L) 08/25/2023    HGB 16.6 08/25/2023    HCT 50.9 (H) 08/25/2023    MCV 98 08/25/2023     08/25/2023       No results found for: \"TSH\"    Lab Results   Component Value Date    CHOL 184 02/27/2017     Lab Results   Component Value Date    TRIG 186 (H) 08/25/2023     Lab Results   Component Value Date    HDL 46 08/25/2023     Lab Results   Component Value Date    LDLCALC 112 (H) 08/25/2023     No results found for: \"HGBA1C\"    Results for orders placed or performed in visit on 07/11/24   Drug Screen Routine w /Conf and Adulteration, urine.   Result Value Ref Range    Buprenorphine Negative CUTOFF:5 ng/mL    ETHYL GLUCURONIDE Negative CUTOFF:500 ng/mL    Amphetamine Screen, Urine Negative CUTOFF:300 ng/mL    Barbiturate Screen, Ur Negative CUTOFF:200 ng/mL    Benzodiazepine Screen, Urine Negative CUTOFF:50 ng/mL    Cocaine Metabolite Negative CUTOFF:150 ng/mL    PCP Scrn, Ur Negative CUTOFF:25 ng/mL    Cannabinoid Scrn, Ur ++POSITIVE++ (A) CUTOFF:20 ng/mL    6-Acetylmorphine, Urine Negative CUTOFF:10 ng/mL    Opiates Screen, Urine Negative CUTOFF:100 ng/mL "    OXYCODONE/OXYMORPHONE Negative CUTOFF:100 ng/mL    Tapentadol Negative CUTOFF:200 ng/mL    FENTANYL Negative CUTOFF:2.0 ng/mL    Methadone Screen, Urine Negative CUTOFF:100 ng/mL    Propoxyphene Screen, Urine Negative CUTOFF:300 ng/mL    Tramadol Scrn, Ur Negative CUTOFF:200 ng/mL    CARISOPRODOL Negative CUTOFF:100 ng/mL    GABAPENTIN, IA Negative CUTOFF:1.0 ug/mL    CREATININE 38 mg/dL    pH 6.0 4.5 - 8.9    Nitrites Urine Negative NEGATIVE ug/mL   Hepatitis B surface antibody   Result Value Ref Range    Hep B S Ab >500.00 3-500 mIU/mL mIU/mL   Hepatitis B core antibody, total   Result Value Ref Range    Hep B Core Total Ab Non-reactive Non-Reactive   Quantiferon TB Gold Plus Gray   Result Value Ref Range    QFT Nil 0.03 0 - 8.0 IU/ml   Quantiferon TB Gold Plus Green   Result Value Ref Range    QFT TB1-NIL 0.14 IU/ml   Quantiferon TB Gold Plus Yellow   Result Value Ref Range    QFT TB2-NIL 0.08 IU/ml   Quantiferon TB Gold Plus Purple   Result Value Ref Range    QFT Mitogen-NIL 9.97 IU/ml    QFT Final Interpretation Negative Negative   CARBOXY-THC CONF, NORMALIZED   Result Value Ref Range    THC/CR RATIO 55 ng/mg creat       Orders Placed This Encounter   Procedures    CBC    Comprehensive metabolic panel    Lipid panel    TSH, 3rd generation    PSA, Total Screen       ALLERGIES:  No Known Allergies    Current Medications     Current Outpatient Medications   Medication Sig Dispense Refill    olmesartan (BENICAR) 20 mg tablet TAKE 1 TABLET BY MOUTH EVERY DAY 90 tablet 0    omeprazole (PriLOSEC) 20 mg delayed release capsule TAKE 1 CAPSULE BY MOUTH EVERY DAY 90 capsule 1     No current facility-administered medications for this visit.         Health Maintenance     Health Maintenance   Topic Date Due    Hepatitis C Screening  Never done    HIV Screening  Never done    Annual Physical  08/25/2024    COVID-19 Vaccine (6 - 2024-25 season) 09/01/2024    Influenza Vaccine (Season Ended) 09/01/2025    Zoster Vaccine (1  of 2) 09/28/2025    Depression Screening  05/01/2026    DTaP,Tdap,and Td Vaccines (8 - Td or Tdap) 02/15/2027    Colorectal Cancer Screening  11/22/2032    IPV Vaccine  Completed    Meningococcal B Vaccine  Aged Out    RSV Vaccine age 0-20 Months  Aged Out    Pneumococcal Vaccine: Pediatrics (0 to 5 Years) and At-Risk Patients (6 to 64 Years)  Aged Out    HIB Vaccine  Aged Out    Hepatitis A Vaccine  Aged Out    Meningococcal ACWY Vaccine  Aged Out    HPV Vaccine  Aged Out     Immunization History   Administered Date(s) Administered    COVID-19 MODERNA VACC 0.5 ML IM 01/29/2021, 02/26/2021, 10/26/2021    COVID-19, unspecified 01/29/2021, 02/26/2021    DT (pediatric) 07/19/1990    DTP 1975, 02/13/1976, 05/18/1976, 12/21/1976, 06/03/1981    Hep B, Adolescent or Pediatric 04/01/1993, 05/08/1993, 05/06/1994    INFLUENZA 11/06/2018, 02/10/2021, 10/01/2021, 10/10/2022, 11/10/2023    Influenza, injectable, quadrivalent, preservative free 0.5 mL 11/06/2018, 09/20/2019    MMR 09/29/1976, 06/22/1982    OPV 01/13/1976, 03/17/1976, 06/22/1976, 04/04/1977    Tdap 06/16/2006, 02/15/2017       Zachary Nuno MD        Answers submitted by the patient for this visit:  Annual Physical (Submitted on 4/28/2025)  Exercise choices: moderate cardiovascular exercise, vigorous cardiovascular exercise, 5-7 times a week on average, 30-60 minutes on average  Sleep choices: sleeps well, 7-8 hours of sleep on average  Vision choices: wears glasses  Dental choices: regular dental visits  Do you currently have an OB/GYN provider that you routinely follow with?: No  Any history of sexual transmitted disease/infection?: No  Urinary symptoms: none  Do you have an advance directive/living will?: No  Do you have a durable power of  (POA)?: No

## 2025-05-31 PROBLEM — Z00.00 WELL ADULT EXAM: Status: RESOLVED | Noted: 2025-05-01 | Resolved: 2025-05-31

## 2025-06-04 DIAGNOSIS — I10 HYPERTENSION, UNSPECIFIED TYPE: ICD-10-CM

## 2025-06-05 RX ORDER — OLMESARTAN MEDOXOMIL 20 MG/1
20 TABLET ORAL DAILY
Qty: 90 TABLET | Refills: 0 | Status: SHIPPED | OUTPATIENT
Start: 2025-06-05

## 2025-07-03 DIAGNOSIS — K22.2 ESOPHAGEAL STRICTURE: ICD-10-CM

## 2025-07-03 RX ORDER — OMEPRAZOLE 20 MG/1
20 CAPSULE, DELAYED RELEASE ORAL DAILY
Qty: 90 CAPSULE | Refills: 1 | Status: SHIPPED | OUTPATIENT
Start: 2025-07-03

## 2025-07-10 ENCOUNTER — TELEPHONE (OUTPATIENT)
Age: 50
End: 2025-07-10

## 2025-07-10 DIAGNOSIS — E66.9 OBESITY (BMI 30-39.9): Primary | ICD-10-CM

## 2025-07-10 RX ORDER — SEMAGLUTIDE 0.25 MG/.5ML
INJECTION, SOLUTION SUBCUTANEOUS
Qty: 2 ML | Refills: 0 | Status: SHIPPED | OUTPATIENT
Start: 2025-07-10

## 2025-07-10 NOTE — TELEPHONE ENCOUNTER
PA for Wegovy 0.25mg SUBMITTED to [facility]    via    []CMM-KEY:  [x]Surescripts-Case ID #: [id number]   []Availity-Auth ID #  []Faxed to plan   []Other website   []Phone call Case ID #     []PA sent as URGENT    All office notes, labs and other pertaining documents and studies sent. Clinical questions answered. Awaiting determination from insurance company.     Turnaround time for your insurance to make a decision on your Prior Authorization can take 7-21 business days.

## 2025-07-11 NOTE — TELEPHONE ENCOUNTER
PA for Wegovy 0.25mg EXCLUSION    Reason:(Screenshot if applicable)        Message sent to office clinical pool Yes    Denial letter scanned into Media Yes

## 2025-07-18 DIAGNOSIS — Z11.1 TUBERCULOSIS SCREENING: Primary | ICD-10-CM

## 2025-07-25 ENCOUNTER — APPOINTMENT (OUTPATIENT)
Dept: LAB | Facility: CLINIC | Age: 50
End: 2025-07-25
Payer: COMMERCIAL

## 2025-07-25 DIAGNOSIS — Z11.1 TUBERCULOSIS SCREENING: ICD-10-CM

## 2025-07-25 DIAGNOSIS — Z00.00 WELL ADULT EXAM: ICD-10-CM

## 2025-07-25 DIAGNOSIS — R35.1 NOCTURIA: ICD-10-CM

## 2025-07-25 LAB
ALBUMIN SERPL BCG-MCNC: 4.5 G/DL (ref 3.5–5)
ALP SERPL-CCNC: 50 U/L (ref 34–104)
ALT SERPL W P-5'-P-CCNC: 16 U/L (ref 7–52)
ANION GAP SERPL CALCULATED.3IONS-SCNC: 8 MMOL/L (ref 4–13)
AST SERPL W P-5'-P-CCNC: 17 U/L (ref 13–39)
BILIRUB SERPL-MCNC: 0.74 MG/DL (ref 0.2–1)
BUN SERPL-MCNC: 20 MG/DL (ref 5–25)
CALCIUM SERPL-MCNC: 9.8 MG/DL (ref 8.4–10.2)
CHLORIDE SERPL-SCNC: 101 MMOL/L (ref 96–108)
CHOLEST SERPL-MCNC: 165 MG/DL (ref ?–200)
CO2 SERPL-SCNC: 29 MMOL/L (ref 21–32)
CREAT SERPL-MCNC: 1.05 MG/DL (ref 0.6–1.3)
ERYTHROCYTE [DISTWIDTH] IN BLOOD BY AUTOMATED COUNT: 11.8 % (ref 11.6–15.1)
GFR SERPL CREATININE-BSD FRML MDRD: 82 ML/MIN/1.73SQ M
GLUCOSE P FAST SERPL-MCNC: 96 MG/DL (ref 65–99)
HCT VFR BLD AUTO: 43.8 % (ref 36.5–49.3)
HDLC SERPL-MCNC: 37 MG/DL
HGB BLD-MCNC: 15 G/DL (ref 12–17)
LDLC SERPL CALC-MCNC: 106 MG/DL (ref 0–100)
MCH RBC QN AUTO: 31.4 PG (ref 26.8–34.3)
MCHC RBC AUTO-ENTMCNC: 34.2 G/DL (ref 31.4–37.4)
MCV RBC AUTO: 92 FL (ref 82–98)
NONHDLC SERPL-MCNC: 128 MG/DL
PLATELET # BLD AUTO: 206 THOUSANDS/UL (ref 149–390)
PMV BLD AUTO: 10.3 FL (ref 8.9–12.7)
POTASSIUM SERPL-SCNC: 4.6 MMOL/L (ref 3.5–5.3)
PROT SERPL-MCNC: 7 G/DL (ref 6.4–8.4)
PSA SERPL-MCNC: 1.11 NG/ML (ref 0–4)
RBC # BLD AUTO: 4.77 MILLION/UL (ref 3.88–5.62)
SODIUM SERPL-SCNC: 138 MMOL/L (ref 135–147)
TRIGL SERPL-MCNC: 110 MG/DL (ref ?–150)
TSH SERPL DL<=0.05 MIU/L-ACNC: 1.67 UIU/ML (ref 0.45–4.5)
WBC # BLD AUTO: 3.78 THOUSAND/UL (ref 4.31–10.16)

## 2025-07-25 PROCEDURE — 80053 COMPREHEN METABOLIC PANEL: CPT

## 2025-07-25 PROCEDURE — 86480 TB TEST CELL IMMUN MEASURE: CPT

## 2025-07-25 PROCEDURE — G0103 PSA SCREENING: HCPCS

## 2025-07-25 PROCEDURE — 85027 COMPLETE CBC AUTOMATED: CPT

## 2025-07-25 PROCEDURE — 84443 ASSAY THYROID STIM HORMONE: CPT

## 2025-07-25 PROCEDURE — 80061 LIPID PANEL: CPT

## 2025-07-25 PROCEDURE — 36415 COLL VENOUS BLD VENIPUNCTURE: CPT

## 2025-07-26 LAB
GAMMA INTERFERON BACKGROUND BLD IA-ACNC: 0.03 IU/ML
M TB IFN-G BLD-IMP: NEGATIVE
M TB IFN-G CD4+ BCKGRND COR BLD-ACNC: 0.12 IU/ML
M TB IFN-G CD4+ BCKGRND COR BLD-ACNC: 0.18 IU/ML
MITOGEN IGNF BCKGRD COR BLD-ACNC: 9.97 IU/ML

## 2025-08-15 ENCOUNTER — NURSE TRIAGE (OUTPATIENT)
Age: 50
End: 2025-08-15

## 2025-08-18 ENCOUNTER — OFFICE VISIT (OUTPATIENT)
Dept: FAMILY MEDICINE CLINIC | Facility: CLINIC | Age: 50
End: 2025-08-18
Payer: COMMERCIAL

## 2025-08-18 VITALS
DIASTOLIC BLOOD PRESSURE: 92 MMHG | SYSTOLIC BLOOD PRESSURE: 140 MMHG | BODY MASS INDEX: 29.93 KG/M2 | RESPIRATION RATE: 18 BRPM | HEIGHT: 72 IN | WEIGHT: 221 LBS | OXYGEN SATURATION: 99 % | TEMPERATURE: 98.4 F | HEART RATE: 78 BPM

## 2025-08-18 DIAGNOSIS — L30.9 DERMATITIS: Primary | ICD-10-CM

## 2025-08-18 PROCEDURE — 99213 OFFICE O/P EST LOW 20 MIN: CPT | Performed by: FAMILY MEDICINE

## 2025-08-18 RX ORDER — PREDNISONE 10 MG/1
TABLET ORAL
Qty: 20 TABLET | Refills: 0 | Status: SHIPPED | OUTPATIENT
Start: 2025-08-18